# Patient Record
Sex: MALE | Race: WHITE | Employment: FULL TIME | URBAN - NONMETROPOLITAN AREA
[De-identification: names, ages, dates, MRNs, and addresses within clinical notes are randomized per-mention and may not be internally consistent; named-entity substitution may affect disease eponyms.]

---

## 2017-07-17 LAB
CHOLESTEROL, TOTAL: 177 MG/DL
CHOLESTEROL/HDL RATIO: 2.7
HDLC SERPL-MCNC: 65 MG/DL (ref 35–70)
LDL CHOLESTEROL CALCULATED: 94.2 MG/DL (ref 0–160)
TRIGL SERPL-MCNC: 89 MG/DL
VLDLC SERPL CALC-MCNC: 18 MG/DL

## 2017-08-01 ENCOUNTER — OFFICE VISIT (OUTPATIENT)
Dept: FAMILY MEDICINE CLINIC | Age: 62
End: 2017-08-01
Payer: COMMERCIAL

## 2017-08-01 VITALS — WEIGHT: 229 LBS | HEART RATE: 76 BPM | SYSTOLIC BLOOD PRESSURE: 116 MMHG | DIASTOLIC BLOOD PRESSURE: 62 MMHG

## 2017-08-01 DIAGNOSIS — K22.2 SCHATZKI'S RING OF DISTAL ESOPHAGUS: ICD-10-CM

## 2017-08-01 DIAGNOSIS — M25.552 PAIN OF LEFT HIP JOINT: ICD-10-CM

## 2017-08-01 DIAGNOSIS — I10 ESSENTIAL HYPERTENSION: Primary | ICD-10-CM

## 2017-08-01 DIAGNOSIS — Z11.59 NEED FOR HEPATITIS C SCREENING TEST: ICD-10-CM

## 2017-08-01 DIAGNOSIS — K57.30 DIVERTICULOSIS OF LARGE INTESTINE WITHOUT HEMORRHAGE: ICD-10-CM

## 2017-08-01 DIAGNOSIS — K64.8 INTERNAL HEMORRHOID: ICD-10-CM

## 2017-08-01 DIAGNOSIS — L98.9 SKIN LESION: ICD-10-CM

## 2017-08-01 PROCEDURE — 99214 OFFICE O/P EST MOD 30 MIN: CPT | Performed by: FAMILY MEDICINE

## 2017-08-01 RX ORDER — GABAPENTIN 300 MG/1
300 CAPSULE ORAL 2 TIMES DAILY
Qty: 90 CAPSULE | Refills: 5 | Status: SHIPPED | OUTPATIENT
Start: 2017-08-01 | End: 2019-01-08 | Stop reason: SINTOL

## 2017-08-01 RX ORDER — ASPIRIN 81 MG/1
81 TABLET ORAL DAILY
COMMUNITY

## 2017-08-01 RX ORDER — OMEPRAZOLE 20 MG/1
20 CAPSULE, DELAYED RELEASE ORAL DAILY
COMMUNITY
End: 2017-12-05 | Stop reason: SDUPTHER

## 2017-08-01 RX ORDER — NEBIVOLOL HYDROCHLORIDE 2.5 MG/1
TABLET ORAL
COMMUNITY
Start: 2017-05-30 | End: 2017-08-24 | Stop reason: SDUPTHER

## 2017-08-07 LAB
AGE FOR GFR: 62
ANION GAP SERPL CALCULATED.3IONS-SCNC: 10 MMOL/L
CHLORIDE BLD-SCNC: 103 MMOL/L
CO2: 30 MMOL/L
CREAT SERPL-MCNC: 1 MG/DL
EGFR BF: 68 ML/MIN/1.73 M2
EGFR BM: 92 ML/MIN/1.73 M2
EGFR WF: 56 ML/MIN/1.73 M2
EGFR WM: 76 ML/MIN/1.73 M2
HEPATITIS C IGG: NORMAL
POTASSIUM SERPL-SCNC: 4.4 MMOL/L
SIGNAL/CUTOFF: NORMAL
SODIUM BLD-SCNC: 139 MMOL/L

## 2017-08-09 DIAGNOSIS — Z11.59 NEED FOR HEPATITIS C SCREENING TEST: ICD-10-CM

## 2017-08-09 LAB — ANTIBODY: NORMAL

## 2017-11-07 ENCOUNTER — OFFICE VISIT (OUTPATIENT)
Dept: FAMILY MEDICINE CLINIC | Age: 62
End: 2017-11-07
Payer: COMMERCIAL

## 2017-11-07 VITALS — SYSTOLIC BLOOD PRESSURE: 128 MMHG | HEART RATE: 72 BPM | DIASTOLIC BLOOD PRESSURE: 68 MMHG | WEIGHT: 239 LBS

## 2017-11-07 DIAGNOSIS — H92.02 OTALGIA OF LEFT EAR: Primary | ICD-10-CM

## 2017-11-07 PROCEDURE — 99212 OFFICE O/P EST SF 10 MIN: CPT | Performed by: FAMILY MEDICINE

## 2017-11-07 RX ORDER — LORATADINE 10 MG/1
10 TABLET ORAL DAILY
Qty: 30 TABLET | Refills: 1 | Status: SHIPPED | OUTPATIENT
Start: 2017-11-07 | End: 2020-01-14

## 2017-11-07 ASSESSMENT — ENCOUNTER SYMPTOMS
SINUS PAIN: 0
SINUS PRESSURE: 0
COUGH: 1
SORE THROAT: 0

## 2017-11-07 NOTE — PROGRESS NOTES
[facility]  [address] [facility]  Dept: [phone]  Dept Fax: [phone]      Felicitas Mccarthy is a 58 y.o. male who presents today for his medical conditions/complaints as noted below. Chief Complaint   Patient presents with    Ear Fullness       HPI:     HPI   Had URI sx last week as that improved noted ear fullness. Phlegm noted though no sinus pressure. Cough now resolved. No fevers noted, feeling fluid movement in ear with tipping head. Current Outpatient Prescriptions   Medication Sig Dispense Refill    loratadine (CLARITIN) 10 MG tablet Take 1 tablet by mouth daily 5-7 days as needed 30 tablet 1    BYSTOLIC 2.5 MG tablet TAKE ONE-HALF (1/2) TO ONE TABLET ONCE DAILY 90 tablet 1    aspirin 81 MG EC tablet Take 81 mg by mouth daily      vitamin D (CHOLECALCIFEROL) 1000 UNIT TABS tablet Take 1,000 Units by mouth daily      Ubiquinol (QUNOL COQ10/UBIQUINOL/MANSOOR) 100 MG CAPS Take by mouth      omeprazole (PRILOSEC) 20 MG delayed release capsule Take 20 mg by mouth daily      gabapentin (NEURONTIN) 300 MG capsule Take 1 capsule by mouth 2 times daily Begin with 1 daily for 2 weeks 90 capsule 5     No current facility-administered medications for this visit. Allergies   Allergen Reactions    Augmentin [Amoxicillin-Pot Clavulanate] Rash       No past medical history on file. No past surgical history on file. Social History     Social History    Marital status:      Spouse name: N/A    Number of children: N/A    Years of education: N/A     Occupational History    Not on file. Social History Main Topics    Smoking status: Never Smoker    Smokeless tobacco: Never Used    Alcohol use Not on file    Drug use: Unknown    Sexual activity: Not on file     Other Topics Concern    Not on file     Social History Narrative    No narrative on file     No family history on file.     Subjective:      Review of Systems

## 2017-12-04 VITALS
BODY MASS INDEX: 29.55 KG/M2 | WEIGHT: 223 LBS | HEIGHT: 73 IN | HEART RATE: 60 BPM | SYSTOLIC BLOOD PRESSURE: 110 MMHG | DIASTOLIC BLOOD PRESSURE: 84 MMHG

## 2017-12-04 DIAGNOSIS — M54.32 SCIATICA OF LEFT SIDE: ICD-10-CM

## 2017-12-04 DIAGNOSIS — K21.9 GASTROESOPHAGEAL REFLUX DISEASE WITHOUT ESOPHAGITIS: ICD-10-CM

## 2017-12-04 DIAGNOSIS — I73.9 PERIPHERAL VASCULAR DISEASE (HCC): ICD-10-CM

## 2017-12-04 DIAGNOSIS — K64.8 INTERNAL HEMORRHOIDS: ICD-10-CM

## 2017-12-04 PROBLEM — E78.5 HYPERLIPIDEMIA: Status: ACTIVE | Noted: 2017-12-04

## 2017-12-04 RX ORDER — MELOXICAM 15 MG/1
15 TABLET ORAL DAILY
COMMUNITY
End: 2020-01-14 | Stop reason: ALTCHOICE

## 2017-12-04 RX ORDER — MULTIVITAMIN WITH IRON
400 TABLET ORAL DAILY
COMMUNITY

## 2017-12-05 ENCOUNTER — OFFICE VISIT (OUTPATIENT)
Dept: FAMILY MEDICINE CLINIC | Age: 62
End: 2017-12-05
Payer: COMMERCIAL

## 2017-12-05 VITALS
HEART RATE: 64 BPM | WEIGHT: 235 LBS | DIASTOLIC BLOOD PRESSURE: 64 MMHG | HEIGHT: 73 IN | BODY MASS INDEX: 31.14 KG/M2 | SYSTOLIC BLOOD PRESSURE: 118 MMHG

## 2017-12-05 DIAGNOSIS — I10 ESSENTIAL HYPERTENSION: ICD-10-CM

## 2017-12-05 DIAGNOSIS — Z00.00 WELL ADULT EXAM: Primary | ICD-10-CM

## 2017-12-05 DIAGNOSIS — Z23 NEED FOR PROPHYLACTIC VACCINATION AND INOCULATION AGAINST INFLUENZA: ICD-10-CM

## 2017-12-05 DIAGNOSIS — Z13.1 ENCOUNTER FOR SCREENING FOR DIABETES MELLITUS: ICD-10-CM

## 2017-12-05 PROCEDURE — 99396 PREV VISIT EST AGE 40-64: CPT | Performed by: FAMILY MEDICINE

## 2017-12-05 PROCEDURE — 90471 IMMUNIZATION ADMIN: CPT | Performed by: FAMILY MEDICINE

## 2017-12-05 PROCEDURE — 90688 IIV4 VACCINE SPLT 0.5 ML IM: CPT | Performed by: FAMILY MEDICINE

## 2017-12-05 RX ORDER — NEBIVOLOL 2.5 MG/1
TABLET ORAL
Qty: 90 TABLET | Refills: 1 | Status: SHIPPED | OUTPATIENT
Start: 2017-12-05 | End: 2018-10-20 | Stop reason: SDUPTHER

## 2017-12-05 RX ORDER — OMEPRAZOLE 20 MG/1
20 CAPSULE, DELAYED RELEASE ORAL DAILY
Qty: 90 CAPSULE | Refills: 1 | Status: SHIPPED | OUTPATIENT
Start: 2017-12-05 | End: 2018-05-07 | Stop reason: SDUPTHER

## 2017-12-05 ASSESSMENT — ENCOUNTER SYMPTOMS: SHORTNESS OF BREATH: 0

## 2017-12-05 NOTE — PROGRESS NOTES
[facility]  [address] [facility]  Dept: [phone]  Dept Fax: [phone]    Kalani Case is a 58 y.o. male who presents today for his medical conditions/complaints as noted below. Kalani Case c/o of Annual Exam      HPI:     HPI   Here for well check, has no forms for work pt is aware     No big complaints    BP Readings from Last 3 Encounters:   12/05/17 118/64   12/06/16 110/84   11/07/17 128/68          (goal 120/80)    No past medical history on file.    Past Surgical History:   Procedure Laterality Date    COLONOSCOPY      3/07; 1/9/17    SHOULDER SURGERY      UPPER GASTROINTESTINAL ENDOSCOPY  02/10/2017    stricture and dilation       Family History   Problem Relation Age of Onset    Breast Cancer Mother     Other Mother      irregular heart rate    Heart Disease Father 58    Atrial Fibrillation Father     Diabetes Father     Coronary Art Dis Father     Diabetes Maternal Grandmother     Diabetes Paternal Grandfather        Social History   Substance Use Topics    Smoking status: Never Smoker    Smokeless tobacco: Never Used    Alcohol use Not on file      Current Outpatient Prescriptions   Medication Sig Dispense Refill    nebivolol (BYSTOLIC) 2.5 MG tablet TAKE ONE-HALF (1/2) TO ONE TABLET ONCE DAILY 90 tablet 1    omeprazole (PRILOSEC) 20 MG delayed release capsule Take 1 capsule by mouth daily 90 capsule 1    aspirin 81 MG EC tablet Take 81 mg by mouth daily      vitamin D (CHOLECALCIFEROL) 1000 UNIT TABS tablet Take 1,000 Units by mouth daily      Ubiquinol (QUNOL COQ10/UBIQUINOL/MANSOOR) 100 MG CAPS Take by mouth      gabapentin (NEURONTIN) 300 MG capsule Take 1 capsule by mouth 2 times daily Begin with 1 daily for 2 weeks 90 capsule 5    meloxicam (MOBIC) 15 MG tablet Take 15 mg by mouth daily      magnesium (MAGNESIUM-OXIDE) 250 MG TABS tablet Take 250 mg by mouth daily      loratadine (CLARITIN) 10 MG tablet Take 1

## 2018-02-06 ENCOUNTER — TELEPHONE (OUTPATIENT)
Dept: FAMILY MEDICINE CLINIC | Age: 63
End: 2018-02-06

## 2018-02-21 ENCOUNTER — TELEPHONE (OUTPATIENT)
Dept: FAMILY MEDICINE CLINIC | Age: 63
End: 2018-02-21

## 2018-02-21 NOTE — TELEPHONE ENCOUNTER
Leah Denson called again about his medical forms and records requested by [facility]. Only the request form was received by [facility], none of the requested records were sent to them. He is trying to obtain short term disability and these records had a deadline to be received. Since it missed the deadline, he has a new fax number for the records to be faxed to, this fax number is [phone]. Do you remember when his records were sent to [facility]?

## 2018-02-22 ENCOUNTER — TELEPHONE (OUTPATIENT)
Dept: FAMILY MEDICINE CLINIC | Age: 63
End: 2018-02-22

## 2018-05-07 RX ORDER — OMEPRAZOLE 20 MG/1
20 CAPSULE, DELAYED RELEASE ORAL DAILY
Qty: 90 CAPSULE | Refills: 1 | Status: SHIPPED | OUTPATIENT
Start: 2018-05-07 | End: 2018-10-20 | Stop reason: SDUPTHER

## 2018-12-11 ENCOUNTER — TELEPHONE (OUTPATIENT)
Dept: FAMILY MEDICINE CLINIC | Age: 63
End: 2018-12-11

## 2018-12-11 DIAGNOSIS — I10 ESSENTIAL HYPERTENSION: ICD-10-CM

## 2018-12-11 LAB
AGE FOR GFR: 63
ANION GAP SERPL CALCULATED.3IONS-SCNC: 10 MMOL/L
CHLORIDE BLD-SCNC: 97 MMOL/L
CO2: 30 MMOL/L
CREAT SERPL-MCNC: 1 MG/DL
EGFR BF: 68 ML/MIN/1.73 M2
EGFR BM: 91 ML/MIN/1.73 M2
EGFR WF: 56 ML/MIN/1.73 M2
EGFR WM: 75 ML/MIN/1.73 M2
GLUCOSE: 86 MG/DL
POTASSIUM SERPL-SCNC: 4.4 MMOL/L
SODIUM BLD-SCNC: 133 MMOL/L

## 2019-01-08 ENCOUNTER — OFFICE VISIT (OUTPATIENT)
Dept: FAMILY MEDICINE CLINIC | Age: 64
End: 2019-01-08
Payer: COMMERCIAL

## 2019-01-08 VITALS
SYSTOLIC BLOOD PRESSURE: 118 MMHG | HEIGHT: 73 IN | WEIGHT: 234 LBS | DIASTOLIC BLOOD PRESSURE: 62 MMHG | HEART RATE: 76 BPM | BODY MASS INDEX: 31.01 KG/M2

## 2019-01-08 DIAGNOSIS — E78.2 MIXED HYPERLIPIDEMIA: ICD-10-CM

## 2019-01-08 DIAGNOSIS — I73.9 PERIPHERAL VASCULAR DISEASE (HCC): ICD-10-CM

## 2019-01-08 DIAGNOSIS — J06.9 VIRAL URI: ICD-10-CM

## 2019-01-08 DIAGNOSIS — Z00.00 WELL ADULT EXAM: Primary | ICD-10-CM

## 2019-01-08 DIAGNOSIS — Z23 NEED FOR PROPHYLACTIC VACCINATION AND INOCULATION AGAINST INFLUENZA: ICD-10-CM

## 2019-01-08 DIAGNOSIS — I10 ESSENTIAL HYPERTENSION: ICD-10-CM

## 2019-01-08 DIAGNOSIS — M25.552 PAIN OF LEFT HIP JOINT: Chronic | ICD-10-CM

## 2019-01-08 PROCEDURE — 90471 IMMUNIZATION ADMIN: CPT | Performed by: FAMILY MEDICINE

## 2019-01-08 PROCEDURE — 99396 PREV VISIT EST AGE 40-64: CPT | Performed by: FAMILY MEDICINE

## 2019-01-08 PROCEDURE — 90674 CCIIV4 VAC NO PRSV 0.5 ML IM: CPT | Performed by: FAMILY MEDICINE

## 2019-01-08 RX ORDER — NEBIVOLOL 2.5 MG/1
TABLET ORAL
Qty: 90 TABLET | Refills: 0 | Status: SHIPPED | OUTPATIENT
Start: 2019-01-08 | End: 2019-07-30 | Stop reason: SDUPTHER

## 2019-01-08 RX ORDER — OMEPRAZOLE 20 MG/1
CAPSULE, DELAYED RELEASE ORAL
Qty: 90 CAPSULE | Refills: 1 | Status: SHIPPED | OUTPATIENT
Start: 2019-01-08 | End: 2019-07-30 | Stop reason: SDUPTHER

## 2019-01-08 RX ORDER — CHLORAL HYDRATE 500 MG
3000 CAPSULE ORAL 3 TIMES DAILY
COMMUNITY

## 2019-01-08 ASSESSMENT — PATIENT HEALTH QUESTIONNAIRE - PHQ9
SUM OF ALL RESPONSES TO PHQ QUESTIONS 1-9: 0
SUM OF ALL RESPONSES TO PHQ QUESTIONS 1-9: 0
1. LITTLE INTEREST OR PLEASURE IN DOING THINGS: 0
2. FEELING DOWN, DEPRESSED OR HOPELESS: 0
SUM OF ALL RESPONSES TO PHQ9 QUESTIONS 1 & 2: 0

## 2019-12-31 LAB
ANION GAP SERPL CALCULATED.3IONS-SCNC: 7.9 MMOL/L
CHLORIDE BLD-SCNC: 102 MMOL/L
CHOLESTEROL, TOTAL: 197 MG/DL
CHOLESTEROL/HDL RATIO: 3.72
CO2: 29 MMOL/L
CREAT SERPL-MCNC: 1 MG/DL
HDLC SERPL-MCNC: 53 MG/DL (ref 35–70)
LDL CHOLESTEROL CALCULATED: 125.2 MG/DL (ref 0–160)
POTASSIUM SERPL-SCNC: 4.2 MMOL/L
SODIUM BLD-SCNC: 140 MMOL/L
TRIGL SERPL-MCNC: 94 MG/DL
VLDLC SERPL CALC-MCNC: 19 MG/DL

## 2020-01-14 ENCOUNTER — OFFICE VISIT (OUTPATIENT)
Dept: FAMILY MEDICINE CLINIC | Age: 65
End: 2020-01-14
Payer: COMMERCIAL

## 2020-01-14 VITALS
BODY MASS INDEX: 31.28 KG/M2 | DIASTOLIC BLOOD PRESSURE: 80 MMHG | OXYGEN SATURATION: 98 % | WEIGHT: 236 LBS | HEIGHT: 73 IN | SYSTOLIC BLOOD PRESSURE: 116 MMHG | HEART RATE: 69 BPM

## 2020-01-14 PROBLEM — Z00.00 WELL ADULT EXAM: Status: ACTIVE | Noted: 2020-01-14

## 2020-01-14 PROCEDURE — 90471 IMMUNIZATION ADMIN: CPT | Performed by: FAMILY MEDICINE

## 2020-01-14 PROCEDURE — 90472 IMMUNIZATION ADMIN EACH ADD: CPT | Performed by: FAMILY MEDICINE

## 2020-01-14 PROCEDURE — 99396 PREV VISIT EST AGE 40-64: CPT | Performed by: FAMILY MEDICINE

## 2020-01-14 PROCEDURE — 90686 IIV4 VACC NO PRSV 0.5 ML IM: CPT | Performed by: FAMILY MEDICINE

## 2020-01-14 PROCEDURE — 90715 TDAP VACCINE 7 YRS/> IM: CPT | Performed by: FAMILY MEDICINE

## 2020-01-14 ASSESSMENT — ENCOUNTER SYMPTOMS
SHORTNESS OF BREATH: 0
SINUS PAIN: 1
COUGH: 1
ABDOMINAL PAIN: 0
NAUSEA: 0
VOMITING: 0

## 2020-01-14 ASSESSMENT — PATIENT HEALTH QUESTIONNAIRE - PHQ9
SUM OF ALL RESPONSES TO PHQ QUESTIONS 1-9: 0
SUM OF ALL RESPONSES TO PHQ9 QUESTIONS 1 & 2: 0
1. LITTLE INTEREST OR PLEASURE IN DOING THINGS: 0
SUM OF ALL RESPONSES TO PHQ QUESTIONS 1-9: 0
2. FEELING DOWN, DEPRESSED OR HOPELESS: 0

## 2020-01-14 NOTE — PROGRESS NOTES
[address] [address]  Dept: [phone]  Dept Fax: [phone]    Joanna Acosta is a 59 y.o. male who presents today for his medical conditions/complaints as noted below. Joanna Acosta c/o of Annual Exam      HPI:     HPI  Pt here for annual wellness evaluation  HTN doing well per pt, still using omeprazole daily- worse if eating late  Not needing meloxicam use as needed for hips- resolved    Reports increasing issues with congestion cough and sinus pressure for the last several days. Patient also complaining of occasional palpitations, no specific triggers noted. BP Readings from Last 3 Encounters:   01/14/20 116/80   01/08/19 118/62   12/05/17 118/64          (goal 120/80)    No past medical history on file. Past Surgical History:   Procedure Laterality Date    COLONOSCOPY      3/07; 1/9/17    SHOULDER SURGERY      UPPER GASTROINTESTINAL ENDOSCOPY  02/10/2017    stricture and dilation       Family History   Problem Relation Age of Onset    Breast Cancer Mother     Other Mother         irregular heart rate    Heart Disease Father 58    Atrial Fibrillation Father     Diabetes Father     Coronary Art Dis Father     Diabetes Maternal Grandmother     Diabetes Paternal Grandfather        Social History     Tobacco Use    Smoking status: Never Smoker    Smokeless tobacco: Never Used   Substance Use Topics    Alcohol use: Not on file      Prior to Visit Medications    Medication Sig Taking?  Authorizing Provider   omeprazole (PRILOSEC) 20 MG delayed release capsule TAKE 1 CAPSULE DAILY  Marco Jones MD   nebivolol (BYSTOLIC) 2.5 MG tablet TAKE ONE-HALF (1/2) TO ONE TABLET DAILY  Marco Jones MD   Omega-3 Fatty Acids (FISH OIL) 1000 MG CAPS Take 3,000 mg by mouth 3 times daily  Historical Provider, MD   meloxicam (MOBIC) 15 MG tablet Take 15 mg by mouth daily  Historical Provider, MD   magnesium (MAGNESIUM-OXIDE) 250 MG TABS tablet Take 250 mg by mouth daily  Historical Provider, MD   loratadine (CLARITIN) 10 MG tablet Take 1 tablet by mouth daily 5-7 days as needed  Jasmin Hennessy MD   aspirin 81 MG EC tablet Take 81 mg by mouth daily  Historical Provider, MD   vitamin D (CHOLECALCIFEROL) 1000 UNIT TABS tablet Take 1,000 Units by mouth daily  Historical Provider, MD   Ubiquinol (QUNOL COQ10/UBIQUINOL/MANSOOR) 100 MG CAPS Take by mouth  Historical Provider, MD     Allergies   Allergen Reactions    Gabapentin Other (See Comments)     Dizziness reported    Augmentin [Amoxicillin-Pot Clavulanate] Rash       Health Maintenance   Topic Date Due    HIV screen  03[birth year]15/1970    Shingles Vaccine (1 of 2) 03/15/2005    Potassium monitoring  12/31/2020    Creatinine monitoring  12/31/2020    Lipid screen  12/31/2024    Colon cancer screen colonoscopy  01/09/2027    DTaP/Tdap/Td vaccine (3 - Td) 01/14/2030    Flu vaccine  Completed    Hepatitis C screen  Completed    Pneumococcal 0-64 years Vaccine  Aged Out       Subjective:      Review of Systems   Constitutional: Negative for activity change, appetite change and fatigue. HENT: Positive for congestion and sinus pain. Eyes: Negative for visual disturbance. Respiratory: Positive for cough (started last friday has not gotten better). Negative for shortness of breath. Cardiovascular: Positive for palpitations (once in a while). Negative for chest pain and leg swelling. Gastrointestinal: Negative for abdominal pain, nausea and vomiting. Genitourinary: Negative for dysuria and frequency. Musculoskeletal: Negative for arthralgias and myalgias. Neurological: Negative for dizziness. Psychiatric/Behavioral: Negative for confusion and sleep disturbance. The patient is not nervous/anxious.         Objective:     /80 (Site: Left Upper Arm, Position: Sitting, Cuff Size: Large Adult)   Pulse 69   Ht 6' 0.52\" (1.842 m)   Wt 236 lb (107 kg)   SpO2 98%   BMI 31.55 kg/m²

## 2020-02-13 PROBLEM — Z00.00 WELL ADULT EXAM: Status: RESOLVED | Noted: 2020-01-14 | Resolved: 2020-02-13

## 2020-10-06 LAB
ANION GAP SERPL CALCULATED.3IONS-SCNC: 5.7 MMOL/L
CHLORIDE BLD-SCNC: 101 MMOL/L (ref 98–120)
CHOLESTEROL/HDL RATIO: 3.27 RATIO (ref 0–4.5)
CHOLESTEROL: 183 MG/DL (ref 50–200)
CO2: 30 MMOL/L (ref 22–31)
CREAT SERPL-MCNC: 0.9 MG/DL (ref 0.7–1.3)
GFR CALCULATED: > 60
HDLC SERPL-MCNC: 56 MG/DL (ref 36–68)
LDL CHOLESTEROL CALCULATED: 114 MG/DL (ref 0–160)
POTASSIUM SERPL-SCNC: 4.4 MMOL/L (ref 3.6–5)
SODIUM BLD-SCNC: 137 MMOL/L (ref 135–145)
TRIGL SERPL-MCNC: 65 MG/DL (ref 10–250)
VLDLC SERPL CALC-MCNC: 13 MG/DL (ref 0–50)

## 2020-10-27 ENCOUNTER — VIRTUAL VISIT (OUTPATIENT)
Dept: FAMILY MEDICINE CLINIC | Age: 65
End: 2020-10-27
Payer: COMMERCIAL

## 2020-10-27 PROCEDURE — 99442 PR PHYS/QHP TELEPHONE EVALUATION 11-20 MIN: CPT | Performed by: FAMILY MEDICINE

## 2020-10-27 RX ORDER — IPRATROPIUM BROMIDE 42 UG/1
2 SPRAY, METERED NASAL 4 TIMES DAILY
Qty: 1 BOTTLE | Refills: 0 | Status: SHIPPED | OUTPATIENT
Start: 2020-10-27 | End: 2021-08-24 | Stop reason: ALTCHOICE

## 2020-10-27 ASSESSMENT — ENCOUNTER SYMPTOMS
ABDOMINAL PAIN: 0
SINUS PAIN: 1
SORE THROAT: 1
WHEEZING: 0
COUGH: 1
NAUSEA: 0
SHORTNESS OF BREATH: 0

## 2020-10-27 NOTE — LETTER
Mylanta 30 ml up to 4 times daily for sore throat  Motrin 600 mg 3 times daily until better plus 1 day  May use atrovent nasal 0.06 % 2 sprays up to 4 times daily as needed for congestion/ear pain

## 2020-10-27 NOTE — PROGRESS NOTES
[id number] [address], [id number]  Dept: [phone]  Dept Fax: [phone]      Arnaldo Lara is a 72 y.o. male who presents today for his medical conditions/complaints as noted below. Chief Complaint   Patient presents with    Pharyngitis    Otalgia       HPI:     HPI  Video visit with pt imtiaz. me with pt at home and me at [facility] office. Pt here without audio working on pt side. 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 Patient does also have some sinus pain which is ongoing and does feel some drainage which has been causing cough also. No fevers noted 98.1  No ill contacts noted, attempted tylenol- slight help. Salt water did not resolve either  No irritation noted    Current Outpatient Medications   Medication Sig Dispense Refill    nebivolol (BYSTOLIC) 2.5 MG tablet TAKE ONE-HALF (1/2) TO ONE TABLET DAILY 90 tablet 3    omeprazole (PRILOSEC) 20 MG delayed release capsule TAKE 1 CAPSULE DAILY 90 capsule 3    Omega-3 Fatty Acids (FISH OIL) 1000 MG CAPS Take 3,000 mg by mouth 3 times daily      magnesium (MAGNESIUM-OXIDE) 250 MG TABS tablet Take 250 mg by mouth daily      aspirin 81 MG EC tablet Take 81 mg by mouth daily      vitamin D (CHOLECALCIFEROL) 1000 UNIT TABS tablet Take 1,000 Units by mouth daily      Ubiquinol (QUNOL COQ10/UBIQUINOL/MANSOOR) 100 MG CAPS Take by mouth       No current facility-administered medications for this visit. Allergies   Allergen Reactions    Gabapentin Other (See Comments)     Dizziness reported    Augmentin [Amoxicillin-Pot Clavulanate] Rash       Subjective:      Review of Systems   Constitutional: Negative for fatigue and fever. Is wondering if sx could be related to drilling concrete on Saturday without a mask on.  Sore throat started after [employer] on Sunday and has continued. Sore throat has continued but his ear is also really bothering him now. HENT: Positive for ear pain, sinus pain ( has had ongoing sinus issues, feels that the drainage is causing his coughing. ) and sore throat. Negative for congestion. Respiratory: Positive for cough. Negative for shortness of breath and wheezing. Cardiovascular: Negative for chest pain. Gastrointestinal: Negative for abdominal pain and nausea. Neurological: Negative for dizziness and headaches. Objective: There were no vitals taken for this visit. Physical Exam  Constitutional:       General: He is not in acute distress. Appearance: He is not ill-appearing or toxic-appearing. Eyes:      Conjunctiva/sclera: Conjunctivae normal.   Pulmonary:      Effort: Pulmonary effort is normal. No respiratory distress. Neurological:      Mental Status: He is alert. Psychiatric:         Thought Content: Thought content normal.         Judgment: Judgment normal.       Video accomplished prior to transition to phone visit. Assessment:   The primary encounter diagnosis was Acute pharyngitis, unspecified etiology. A diagnosis of Otalgia of right ear was also pertinent to this visit. Plan:     Patient Instructions   Mylanta 30 ml up to 4 times daily for sore throat  Motrin 600 mg 3 times daily until better plus 1 day  May use atrovent nasal 0.06 % 2 sprays up to 4 times daily as needed for congestion/ear pain      Return if symptoms worsen or fail to improve. No orders of the defined types were placed in this encounter. No orders of the defined types were placed in this encounter.          Electronically signed by Maya Begum MD on 10/27/2020 at 2:31 PM

## 2020-10-29 ENCOUNTER — TELEPHONE (OUTPATIENT)
Dept: FAMILY MEDICINE CLINIC | Age: 65
End: 2020-10-29

## 2020-10-29 NOTE — TELEPHONE ENCOUNTER
Pt called stating having a low grade fever of 99.4 that started on Tuesday, pt is taking ibuprofen, also has a canker sore on right side, wants to know if there is anything he should do?

## 2020-10-29 NOTE — TELEPHONE ENCOUNTER
Likely all symptoms related to viral infection. No specific treatment needed. Encourage good fluid intake except coffee, tea, alcohol and should resolve in 7-10 days.    Thanks

## 2020-10-30 ENCOUNTER — HOSPITAL ENCOUNTER (OUTPATIENT)
Age: 65
Setting detail: SPECIMEN
Discharge: HOME | End: 2020-10-30
Payer: COMMERCIAL

## 2020-10-30 ENCOUNTER — OFFICE VISIT (OUTPATIENT)
Dept: FAMILY MEDICINE CLINIC | Age: 65
End: 2020-10-30
Payer: COMMERCIAL

## 2020-10-30 VITALS
TEMPERATURE: 98.7 F | SYSTOLIC BLOOD PRESSURE: 136 MMHG | DIASTOLIC BLOOD PRESSURE: 80 MMHG | OXYGEN SATURATION: 98 % | HEART RATE: 80 BPM | RESPIRATION RATE: 16 BRPM | WEIGHT: 228 LBS | BODY MASS INDEX: 30.48 KG/M2

## 2020-10-30 LAB — S PYO AG THROAT QL: NORMAL

## 2020-10-30 PROCEDURE — 96372 THER/PROPH/DIAG INJ SC/IM: CPT | Performed by: NURSE PRACTITIONER

## 2020-10-30 PROCEDURE — 99214 OFFICE O/P EST MOD 30 MIN: CPT | Performed by: NURSE PRACTITIONER

## 2020-10-30 PROCEDURE — 87880 STREP A ASSAY W/OPTIC: CPT | Performed by: NURSE PRACTITIONER

## 2020-10-30 PROCEDURE — 87081 CULTURE SCREEN ONLY: CPT

## 2020-10-30 RX ORDER — CLINDAMYCIN HYDROCHLORIDE 150 MG/1
450 CAPSULE ORAL 3 TIMES DAILY
Qty: 90 CAPSULE | Refills: 0 | Status: SHIPPED | OUTPATIENT
Start: 2020-10-30 | End: 2020-11-09

## 2020-10-30 RX ORDER — DEXAMETHASONE SODIUM PHOSPHATE 4 MG/ML
10 INJECTION, SOLUTION INTRA-ARTICULAR; INTRALESIONAL; INTRAMUSCULAR; INTRAVENOUS; SOFT TISSUE ONCE
Status: COMPLETED | OUTPATIENT
Start: 2020-10-30 | End: 2020-10-30

## 2020-10-30 RX ADMIN — DEXAMETHASONE SODIUM PHOSPHATE 10 MG: 4 INJECTION, SOLUTION INTRA-ARTICULAR; INTRALESIONAL; INTRAMUSCULAR; INTRAVENOUS; SOFT TISSUE at 11:30

## 2020-10-30 ASSESSMENT — ENCOUNTER SYMPTOMS
VOMITING: 0
COUGH: 0
NAUSEA: 0
SINUS PAIN: 0
SORE THROAT: 1
FACIAL SWELLING: 1
CHEST TIGHTNESS: 0
SINUS PRESSURE: 0
SWOLLEN GLANDS: 1
SHORTNESS OF BREATH: 0
TROUBLE SWALLOWING: 0
VOICE CHANGE: 0

## 2020-10-30 ASSESSMENT — PATIENT HEALTH QUESTIONNAIRE - PHQ9
SUM OF ALL RESPONSES TO PHQ QUESTIONS 1-9: 0
SUM OF ALL RESPONSES TO PHQ QUESTIONS 1-9: 0
2. FEELING DOWN, DEPRESSED OR HOPELESS: 0
1. LITTLE INTEREST OR PLEASURE IN DOING THINGS: 0
SUM OF ALL RESPONSES TO PHQ QUESTIONS 1-9: 0
SUM OF ALL RESPONSES TO PHQ9 QUESTIONS 1 & 2: 0

## 2020-10-30 NOTE — PROGRESS NOTES
[address], APRN-CNP  [address]  Phone:  [phone]  Fax:  [phone]  Juani Guzman is a 72 y.o. male who presents today for his medical conditions/complaints as noted below. Juani Guzman c/o of Jaw Pain (sore throat past 3 days, low grade fever, swelling face)    Sore throat came first, now side of face is getting swollen and painful. HPI:     Pharyngitis   This is a new problem. The current episode started in the past 7 days (started 4 days ago). The problem occurs constantly. The problem has been gradually worsening. Associated symptoms include chills, a fever (tmax 100.1, broke last night), neck pain, a sore throat and swollen glands. Pertinent negatives include no congestion, coughing, diaphoresis, headaches, nausea or vomiting. Associated symptoms comments: Feels like he can't exhale through his nose  . The symptoms are aggravated by swallowing. He has tried acetaminophen and NSAIDs (atrovent nasal) for the symptoms. The treatment provided moderate relief. Wt Readings from Last 3 Encounters:   10/30/20 228 lb (103.4 kg)   01/14/20 236 lb (107 kg)   01/08/19 234 lb (106.1 kg)       Temp Readings from Last 3 Encounters:   10/30/20 98.7 °F (37.1 °C)       BP Readings from Last 3 Encounters:   10/30/20 136/80   01/14/20 116/80   01/08/19 118/62       Pulse Readings from Last 3 Encounters:   10/30/20 80   01/14/20 69   01/08/19 76              History reviewed. No pertinent past medical history.    Past Surgical History:   Procedure Laterality Date    COLONOSCOPY      3/07; 1/9/17    SHOULDER SURGERY      UPPER GASTROINTESTINAL ENDOSCOPY  02/10/2017    stricture and dilation     Family History   Problem Relation Age of Onset    Breast Cancer Mother     Other Mother         irregular heart rate    Heart Disease Father 58    Atrial Fibrillation Father     Diabetes Father     Coronary Art Dis Father     Diabetes Maternal Grandmother     Diabetes Paternal Grandfather      Social History     Tobacco Use    Smoking status: Never Smoker    Smokeless tobacco: Never Used   Substance Use Topics    Alcohol use: Not on file      Current Outpatient Medications   Medication Sig Dispense Refill    clindamycin (CLEOCIN) 150 MG capsule Take 3 capsules by mouth 3 times daily for 10 days 90 capsule 0    ipratropium (ATROVENT) 0.06 % nasal spray 2 sprays by Each Nostril route 4 times daily 1 Bottle 0    nebivolol (BYSTOLIC) 2.5 MG tablet TAKE ONE-HALF (1/2) TO ONE TABLET DAILY 90 tablet 3    omeprazole (PRILOSEC) 20 MG delayed release capsule TAKE 1 CAPSULE DAILY 90 capsule 3    Omega-3 Fatty Acids (FISH OIL) 1000 MG CAPS Take 3,000 mg by mouth 3 times daily      magnesium (MAGNESIUM-OXIDE) 250 MG TABS tablet Take 250 mg by mouth daily      aspirin 81 MG EC tablet Take 81 mg by mouth daily      vitamin D (CHOLECALCIFEROL) 1000 UNIT TABS tablet Take 1,000 Units by mouth daily      Ubiquinol (QUNOL COQ10/UBIQUINOL/MANSOOR) 100 MG CAPS Take by mouth       Current Facility-Administered Medications   Medication Dose Route Frequency Provider Last Rate Last Dose    dexamethasone (DECADRON) injection 10 mg  10 mg Other Once JAVIER Barahona NP         Allergies   Allergen Reactions    Gabapentin Other (See Comments)     Dizziness reported    Augmentin [Amoxicillin-Pot Clavulanate] Rash       No exam data present    Subjective:      Review of Systems   Constitutional: Positive for chills and fever (tmax 100.1, broke last night). Negative for diaphoresis. HENT: Positive for ear pain, facial swelling and sore throat. Negative for congestion, drooling, sinus pressure, sinus pain, trouble swallowing and voice change. Pain with swallowing     Respiratory: Negative for cough, chest tightness and shortness of breath. Gastrointestinal: Negative for nausea and vomiting. Musculoskeletal: Positive for neck pain.    Neurological: Negative for headaches. Objective:     /80 (Site: Right Upper Arm, Position: Sitting, Cuff Size: Medium Adult)   Pulse 80   Temp 98.7 °F (37.1 °C)   Resp 16   Wt 228 lb (103.4 kg)   SpO2 98%   BMI 30.48 kg/m²     Physical Exam  Vitals signs reviewed. Constitutional:       General: He is not in acute distress. Appearance: He is well-developed. He is not ill-appearing, toxic-appearing or diaphoretic. HENT:      Head: Normocephalic. Salivary Glands: Right salivary gland is diffusely enlarged and tender. Right Ear: Tympanic membrane, ear canal and external ear normal.      Left Ear: Tympanic membrane, ear canal and external ear normal.      Nose: Nose normal. No mucosal edema, congestion or rhinorrhea. Mouth/Throat:      Lips: Pink. Mouth: Mucous membranes are moist. Mucous membranes are not pale and not dry. Pharynx: Pharyngeal swelling, posterior oropharyngeal erythema and uvula swelling present. No oropharyngeal exudate. Tonsils: 4+ on the right. 2+ on the left. Eyes:      General: Lids are normal. No scleral icterus. Right eye: No discharge. Left eye: No discharge. Extraocular Movements:      Right eye: No nystagmus. Left eye: No nystagmus. Conjunctiva/sclera: Conjunctivae normal.   Neck:      Musculoskeletal: Full passive range of motion without pain and normal range of motion. Trachea: Trachea normal.   Cardiovascular:      Rate and Rhythm: Normal rate and regular rhythm. Pulses: Normal pulses. Heart sounds: Normal heart sounds. Pulmonary:      Effort: Pulmonary effort is normal. No accessory muscle usage or respiratory distress. Breath sounds: Normal breath sounds. No stridor. No wheezing, rhonchi or rales. Chest:      Chest wall: No tenderness. Musculoskeletal: Normal range of motion. Skin:     General: Skin is warm and dry. Capillary Refill: Capillary refill takes less than 2 seconds. (Tylenol), ibuprofen (Advil, Motrin), or naproxen (Aleve). Be safe with medicines. Read and follow all instructions on the label. Do not give aspirin to anyone younger than 20. It has been linked to Reye syndrome, a serious illness. · Put an ice or heat pack (whichever feels better) on the swollen jaw for 10 to 20 minutes at a time. Put a thin cloth between the ice or heat pack and the skin. · Suck on ice chips or ice treats such as Popsicles. Eat soft foods that do not have to be chewed much. · If your doctor prescribed antibiotics, take them as directed. Do not stop taking them just because you feel better. You need to take the full course of antibiotics. To prevent tooth problems  · Brush and floss every day, and have regular dental checkups. · Eat a healthy diet, and avoid sugary foods and drinks. · Do not smoke or use spit tobacco. Tobacco use slows your ability to heal. It also increases your risk for gum disease and cancer of the mouth and throat. If you need help quitting, talk to your doctor about stop-smoking programs and medicines. These can increase your chances of quitting for good. When should you call for help? Call 911 anytime you think you may need emergency care. For example, call if:    · You have trouble breathing. Call your doctor now or seek immediate medical care if:    · You have new or worse symptoms of infection, such as:  ? Increased pain, swelling, warmth, or redness. ? Red streaks leading from the area. ? Pus draining from the area. ? A fever.     · You have new pain, or the pain gets worse. Watch closely for changes in your health, and be sure to contact your doctor if:    · You do not feel better as expected. Where can you learn more? Go to https://Neurotrope Biosciencegranteb.Premise. org and sign in to your Transphorm account. Enter L645 in the TalkBox [employer] box to learn more about \"Parotitis: Care Instructions. \"     If you do not have an account, please click on the \"Sign Up Now\" link. Current as of: April 15, 2020               Content Version: 12.6  © 2006-2020 [employer] Care instructions adapted under license by [facility] ([facility]). If you have questions about a medical condition or this instruction, always ask your healthcare professional. [address] any warranty or liability for your use of this information. Patient Education        Tonsillitis: Care Instructions  Your Care Instructions     Tonsillitis is an infection of the tonsils that is caused by bacteria or a virus. The tonsils are in the back of the throat and are part of the immune system. Tonsillitis typically lasts from a few days up to a couple of weeks. Tonsillitis caused by a virus goes away on its own. Tonsillitis caused by the bacteria that causes strep throat is treated with antibiotics. You and your doctor may consider surgery to remove the tonsils (tonsillectomy) if you have serious complications or repeat infections. Follow-up care is a key part of your treatment and safety. Be sure to make and go to all appointments, and call your doctor if you are having problems. It's also a good idea to know your test results and keep a list of the medicines you take. How can you care for yourself at home? · If your doctor prescribed antibiotics, take them as directed. Do not stop taking them just because you feel better. You need to take the full course of antibiotics. · Gargle with warm salt water. This helps reduce swelling and relieve discomfort. Gargle once an hour with 1 teaspoon of salt mixed in 8 fluid ounces of warm water. · Take an over-the-counter pain medicine, such as acetaminophen (Tylenol), ibuprofen (Advil, Motrin), or naproxen (Aleve). Be safe with medicines. Read and follow all instructions on the label. No one younger than 20 should take aspirin. It has been linked to Reye syndrome, a serious illness.   · Be careful when taking over-the-counter cold or flu medicines and Tylenol at the same time. Many of these medicines have acetaminophen, which is Tylenol. Read the labels to make sure that you are not taking more than the recommended dose. Too much acetaminophen (Tylenol) can be harmful. · Try an over-the-counter throat spray to relieve throat pain. · Drink plenty of fluids. Fluids may help soothe an irritated throat. Drink warm or cool liquids (whichever feels better). These include tea, soup, and juice. · Do not smoke, and avoid secondhand smoke. Smoking can make tonsillitis worse. If you need help quitting, talk to your doctor about stop-smoking programs and medicines. These can increase your chances of quitting for good. · Use a vaporizer or humidifier to add moisture to your bedroom. Follow the directions for cleaning the machine. When should you call for help? Call your doctor now or seek immediate medical care if:    · Your pain gets worse on one side of your throat.     · You have a new or higher fever.     · You notice changes in your voice.     · You have trouble opening your mouth.     · You have any trouble breathing.     · You have much more trouble swallowing.     · You have a fever with a stiff neck or a severe headache.     · You are sensitive to light or feel very sleepy or confused. Watch closely for changes in your health, and be sure to contact your doctor if:    · You do not get better after 2 days. Where can you learn more? Go to https://SonoseddieServeron."CollabIP, Inc.". org and sign in to your MediaInterface [place] account. Enter G130 in the [employer] box to learn more about \"Tonsillitis: Care Instructions. \"     If you do not have an account, please click on the \"Sign Up Now\" link. Current as of: April 15, 2020               Content Version: 12.6  © 9476-2427 [employer]. Care instructions adapted under license by [facility] ([facility]).  If you have questions about a medical condition or this instruction, always ask

## 2020-10-30 NOTE — PATIENT INSTRUCTIONS
Clindamycin 450 mg three times a day for 10 days  Dexamethasone 10 mg   Patient Education        Parotitis: Care Instructions  Your Care Instructions     Parotitis is a painful swelling of your parotid glands, which are salivary glands located between the ear and jaw. The most common cause is a virus, such as mumps, herpes, or Jaz-Barr. Bacterial infections, diabetes, tumors or stones in the saliva glands, and tooth problems also may cause parotitis. Follow-up care is a key part of your treatment and safety. Be sure to make and go to all appointments, and call your doctor if you are having problems. It's also a good idea to know your test results and keep a list of the medicines you take. How can you care for yourself at home? · Use an over-the-counter pain medicine if needed, such as acetaminophen (Tylenol), ibuprofen (Advil, Motrin), or naproxen (Aleve). Be safe with medicines. Read and follow all instructions on the label. Do not give aspirin to anyone younger than 20. It has been linked to Reye syndrome, a serious illness. · Put an ice or heat pack (whichever feels better) on the swollen jaw for 10 to 20 minutes at a time. Put a thin cloth between the ice or heat pack and the skin. · Suck on ice chips or ice treats such as Popsicles. Eat soft foods that do not have to be chewed much. · If your doctor prescribed antibiotics, take them as directed. Do not stop taking them just because you feel better. You need to take the full course of antibiotics. To prevent tooth problems  · Brush and floss every day, and have regular dental checkups. · Eat a healthy diet, and avoid sugary foods and drinks. · Do not smoke or use spit tobacco. Tobacco use slows your ability to heal. It also increases your risk for gum disease and cancer of the mouth and throat. If you need help quitting, talk to your doctor about stop-smoking programs and medicines. These can increase your chances of quitting for good.   When should you call for help? Call 911 anytime you think you may need emergency care. For example, call if:    · You have trouble breathing. Call your doctor now or seek immediate medical care if:    · You have new or worse symptoms of infection, such as:  ? Increased pain, swelling, warmth, or redness. ? Red streaks leading from the area. ? Pus draining from the area. ? A fever.     · You have new pain, or the pain gets worse. Watch closely for changes in your health, and be sure to contact your doctor if:    · You do not feel better as expected. Where can you learn more? Go to https://EventBrowsr.compepiceweb.Lynk. org and sign in to your Panelfly account. Enter O115 in the ClassBadges box to learn more about \"Parotitis: Care Instructions. \"     If you do not have an account, please click on the \"Sign Up Now\" link. Current as of: April 15, 2020               Content Version: 12.6  © 2006-2020 [employer]. Care instructions adapted under license by [facility] ([facility]). If you have questions about a medical condition or this instruction, always ask your healthcare professional. [place] [address] any warranty or liability for your use of this information. Patient Education        Tonsillitis: Care Instructions  Your Care Instructions     Tonsillitis is an infection of the tonsils that is caused by bacteria or a virus. The tonsils are in the back of the throat and are part of the immune system. Tonsillitis typically lasts from a few days up to a couple of weeks. Tonsillitis caused by a virus goes away on its own. Tonsillitis caused by the bacteria that causes strep throat is treated with antibiotics. You and your doctor may consider surgery to remove the tonsils (tonsillectomy) if you have serious complications or repeat infections. Follow-up care is a key part of your treatment and safety.  Be sure to make and go to all appointments, and call your doctor if you are having problems. It's also a good idea to know your test results and keep a list of the medicines you take. How can you care for yourself at home? · If your doctor prescribed antibiotics, take them as directed. Do not stop taking them just because you feel better. You need to take the full course of antibiotics. · Gargle with warm salt water. This helps reduce swelling and relieve discomfort. Gargle once an hour with 1 teaspoon of salt mixed in 8 fluid ounces of warm water. · Take an over-the-counter pain medicine, such as acetaminophen (Tylenol), ibuprofen (Advil, Motrin), or naproxen (Aleve). Be safe with medicines. Read and follow all instructions on the label. No one younger than 20 should take aspirin. It has been linked to Reye syndrome, a serious illness. · Be careful when taking over-the-counter cold or flu medicines and Tylenol at the same time. Many of these medicines have acetaminophen, which is Tylenol. Read the labels to make sure that you are not taking more than the recommended dose. Too much acetaminophen (Tylenol) can be harmful. · Try an over-the-counter throat spray to relieve throat pain. · Drink plenty of fluids. Fluids may help soothe an irritated throat. Drink warm or cool liquids (whichever feels better). These include tea, soup, and juice. · Do not smoke, and avoid secondhand smoke. Smoking can make tonsillitis worse. If you need help quitting, talk to your doctor about stop-smoking programs and medicines. These can increase your chances of quitting for good. · Use a vaporizer or humidifier to add moisture to your bedroom. Follow the directions for cleaning the machine. When should you call for help?    Call your doctor now or seek immediate medical care if:    · Your pain gets worse on one side of your throat.     · You have a new or higher fever.     · You notice changes in your voice.     · You have trouble opening your mouth.     · You have any trouble breathing.     · You have much

## 2020-10-31 ENCOUNTER — OFFICE VISIT (OUTPATIENT)
Dept: FAMILY MEDICINE CLINIC | Age: 65
End: 2020-10-31
Payer: COMMERCIAL

## 2020-10-31 VITALS
BODY MASS INDEX: 30.48 KG/M2 | SYSTOLIC BLOOD PRESSURE: 136 MMHG | WEIGHT: 228 LBS | OXYGEN SATURATION: 97 % | DIASTOLIC BLOOD PRESSURE: 82 MMHG | HEART RATE: 80 BPM

## 2020-10-31 PROCEDURE — 99213 OFFICE O/P EST LOW 20 MIN: CPT | Performed by: PHYSICIAN ASSISTANT

## 2020-10-31 ASSESSMENT — ENCOUNTER SYMPTOMS
RESPIRATORY NEGATIVE: 1
NAUSEA: 0

## 2020-10-31 NOTE — PROGRESS NOTES
[facility]    Subjective:      Patient ID: Mignon Riley is a 72 y.o. y.o. male. Patient is seen today for recheck on tonsillitis. He feels better today he states. Has questions regarding compresses and probiotics. No fever chills. He is able to swallow now. Still has some swelling right mandibular area it is slightly tender. Taking motrin too for pain and swelling. History reviewed. No pertinent past medical history.     Past Surgical History:   Procedure Laterality Date    COLONOSCOPY      3/07; 1/9/17    SHOULDER SURGERY      UPPER GASTROINTESTINAL ENDOSCOPY  02/10/2017    stricture and dilation       Family History   Problem Relation Age of Onset    Breast Cancer Mother     Other Mother         irregular heart rate    Heart Disease Father 58    Atrial Fibrillation Father     Diabetes Father     Coronary Art Dis Father     Diabetes Maternal Grandmother     Diabetes Paternal Grandfather        Allergies   Allergen Reactions    Gabapentin Other (See Comments)     Dizziness reported    Augmentin [Amoxicillin-Pot Clavulanate] Rash       Current Outpatient Medications   Medication Sig Dispense Refill    clindamycin (CLEOCIN) 150 MG capsule Take 3 capsules by mouth 3 times daily for 10 days 90 capsule 0    ipratropium (ATROVENT) 0.06 % nasal spray 2 sprays by Each Nostril route 4 times daily 1 Bottle 0    nebivolol (BYSTOLIC) 2.5 MG tablet TAKE ONE-HALF (1/2) TO ONE TABLET DAILY 90 tablet 3    omeprazole (PRILOSEC) 20 MG delayed release capsule TAKE 1 CAPSULE DAILY 90 capsule 3    Omega-3 Fatty Acids (FISH OIL) 1000 MG CAPS Take 3,000 mg by mouth 3 times daily      magnesium (MAGNESIUM-OXIDE) 250 MG TABS tablet Take 250 mg by mouth daily      aspirin 81 MG EC tablet Take 81 mg by mouth daily      vitamin D (CHOLECALCIFEROL) 1000 UNIT TABS tablet Take 1,000 Units by mouth daily      Ubiquinol (QUNOL COQ10/UBIQUINOL/MANSOOR) 100 MG CAPS Take by mouth       No current facility-administered medications for this visit. Review of Systems   Constitutional: Positive for appetite change. Negative for fatigue. HENT: Positive for postnasal drip. Respiratory: Negative. Cardiovascular: Negative. Gastrointestinal: Negative for nausea. Taking prilosec and mylanta. He did have some gerd last night in bed and had some regurgitation last night. Added a pillow and it helped. Had a bad taste in mouth this am.    Musculoskeletal: Negative. Neurological: Negative. Psychiatric/Behavioral: Negative for sleep disturbance. Objective:      /82 (Site: Right Upper Arm)   Pulse 80   Wt 228 lb (103.4 kg)   SpO2 97%   BMI 30.48 kg/m²     Physical Exam  Vitals signs and nursing note reviewed. Constitutional:       General: He is not in acute distress. Appearance: Normal appearance. He is well-developed. He is not ill-appearing. HENT:      Head: Normocephalic and atraumatic. Right Ear: Tympanic membrane, ear canal and external ear normal.      Left Ear: Tympanic membrane, ear canal and external ear normal.      Ears:      Comments: Scant cerumen noted in the entrance of ear canals. Nose: Nose normal. No congestion or rhinorrhea. Mouth/Throat:      Mouth: Mucous membranes are moist.      Pharynx: Oropharyngeal exudate and posterior oropharyngeal erythema present. Comments: Erythema noted in pharynx white exudate noted in the posterior pharynx. Slight postnasal drip noted as well. Eyes:      General: No scleral icterus. Conjunctiva/sclera: Conjunctivae normal.   Neck:      Musculoskeletal: Normal range of motion and neck supple. Muscular tenderness present. No neck rigidity. Comments: He has mild swelling in the right mandibular area. Slight pain in the mandibular area and preauricular area. Mild lymphadenopathy noted in the preauricular area the right tonsillar area and mandibular region.    Cardiovascular:      Rate and Rhythm: Normal rate and regular rhythm. Heart sounds: Normal heart sounds. No murmur. No gallop. Pulmonary:      Effort: Pulmonary effort is normal. No respiratory distress. Breath sounds: Normal breath sounds. No stridor. No wheezing, rhonchi or rales. Abdominal:      General: Bowel sounds are normal. There is no distension. Palpations: Abdomen is soft. There is no mass. Tenderness: There is no abdominal tenderness. There is no guarding or rebound. Hernia: No hernia is present. Musculoskeletal:         General: No swelling, tenderness, deformity or signs of injury. Right lower leg: No edema. Left lower leg: No edema. Lymphadenopathy:      Cervical: Cervical adenopathy present. Skin:     General: Skin is warm and dry. Findings: No erythema or rash. Neurological:      General: No focal deficit present. Mental Status: He is alert and oriented to person, place, and time. Sensory: No sensory deficit. Gait: Gait normal.   Psychiatric:         Mood and Affect: Mood normal.         Behavior: Behavior normal.         Thought Content: Thought content normal.         Judgment: Judgment normal.           Assessment & Plan:     1.  Tonsillitis        Warm compresses to face  Push fluids   nsaids are ok may consider taking prilosec bid while on tx  Discussed probiotics and sinus rinses with patient  Watch for side effects of cleocin  Follow up if any problems  Answered his questions    MARK Chun  10/31/2020 10:41 AM EDT    (Pleasenote that portions of this note were completed with a voice recognition program.Efforts were made to edit the dictations but occasionally words are mis-transcribed.)

## 2020-11-01 LAB
CULTURE: NORMAL
Lab: NORMAL
SPECIMEN DESCRIPTION: NORMAL

## 2021-08-11 RX ORDER — OMEPRAZOLE 20 MG/1
CAPSULE, DELAYED RELEASE ORAL
Qty: 30 CAPSULE | Refills: 0 | Status: CANCELLED | OUTPATIENT
Start: 2021-08-11

## 2021-08-11 RX ORDER — NEBIVOLOL 2.5 MG/1
TABLET ORAL
Qty: 30 TABLET | Refills: 0 | Status: CANCELLED | OUTPATIENT
Start: 2021-08-11

## 2021-08-11 NOTE — TELEPHONE ENCOUNTER
Debbi Fragoso is requesting a refill on the following medication(s):  Requested Prescriptions     Pending Prescriptions Disp Refills    nebivolol (BYSTOLIC) 2.5 MG tablet 90 tablet 3     Sig: TAKE ONE-HALF (1/2) TO ONE TABLET DAILY    omeprazole (PRILOSEC) 20 MG delayed release capsule 90 capsule 1       Last Visit Date (If Applicable):  19/60/2594    Next Visit Date:    8/24/2021

## 2021-08-12 RX ORDER — NEBIVOLOL 2.5 MG/1
TABLET ORAL
Qty: 90 TABLET | Refills: 0 | Status: SHIPPED | OUTPATIENT
Start: 2021-08-12 | End: 2021-11-09 | Stop reason: SDUPTHER

## 2021-08-12 RX ORDER — OMEPRAZOLE 20 MG/1
CAPSULE, DELAYED RELEASE ORAL
Qty: 90 CAPSULE | Refills: 0 | Status: SHIPPED | OUTPATIENT
Start: 2021-08-12 | End: 2021-11-07 | Stop reason: SDUPTHER

## 2021-08-24 ENCOUNTER — OFFICE VISIT (OUTPATIENT)
Dept: FAMILY MEDICINE CLINIC | Age: 66
End: 2021-08-24
Payer: COMMERCIAL

## 2021-08-24 VITALS
BODY MASS INDEX: 31.15 KG/M2 | WEIGHT: 230 LBS | SYSTOLIC BLOOD PRESSURE: 122 MMHG | HEART RATE: 68 BPM | DIASTOLIC BLOOD PRESSURE: 80 MMHG | OXYGEN SATURATION: 95 % | RESPIRATION RATE: 20 BRPM | HEIGHT: 72 IN

## 2021-08-24 DIAGNOSIS — K22.2 SCHATZKI'S RING OF DISTAL ESOPHAGUS: ICD-10-CM

## 2021-08-24 DIAGNOSIS — M70.61 GREATER TROCHANTERIC BURSITIS OF RIGHT HIP: ICD-10-CM

## 2021-08-24 DIAGNOSIS — M20.41 HAMMER TOE OF RIGHT FOOT: ICD-10-CM

## 2021-08-24 DIAGNOSIS — M79.671 RIGHT FOOT PAIN: ICD-10-CM

## 2021-08-24 DIAGNOSIS — I10 ESSENTIAL HYPERTENSION: Primary | ICD-10-CM

## 2021-08-24 PROCEDURE — 99214 OFFICE O/P EST MOD 30 MIN: CPT | Performed by: FAMILY MEDICINE

## 2021-08-24 RX ORDER — CELECOXIB 200 MG/1
200 CAPSULE ORAL DAILY
Qty: 90 CAPSULE | Refills: 1 | Status: SHIPPED | OUTPATIENT
Start: 2021-08-24

## 2021-08-24 ASSESSMENT — ENCOUNTER SYMPTOMS
ABDOMINAL PAIN: 0
SHORTNESS OF BREATH: 0

## 2021-08-24 NOTE — PROGRESS NOTES
sounds: No murmur heard. Pulmonary:      Effort: Pulmonary effort is normal. No respiratory distress. Abdominal:      General: Bowel sounds are normal.   Musculoskeletal:         General: Tenderness (right foot metatarsal and ray tenderness.) present. No swelling. Right upper leg: Bony tenderness (trochanteric bursitis) present. Left lower leg: Normal.   Neurological:      Mental Status: He is alert. Left hip xray 2017 mild arthritis changes    Assessment:     1. Essential hypertension    2. Schatzki's ring of distal esophagus    3. BMI 31.0-31.9,adult    4. Hammer toe of right foot    5. Right foot pain    6. Greater trochanteric bursitis of right hip      No results found for this visit on 08/24/21. Plan:     Orders Placed This Encounter   Procedures    XR HIP RIGHT (2-3 VIEWS)     Standing Status:   Future     Standing Expiration Date:   8/24/2022    XR FOOT RIGHT (MIN 3 VIEWS)     Standing Status:   Future     Standing Expiration Date:   8/24/2022     Order Specific Question:   Reason for exam:     Answer:   1-3rd metatarsals    Amb External Referral To Physical Therapy     Referral Priority:   Routine     Referral Type:   Physical Therapy     Referral Reason:   Specialty Services Required     Referred to Provider:   Halina Chambers     Requested Specialty:   Physical Therapy     Number of Visits Requested:   Carlos Woods DPM, Podiatry, [place]     Referral Priority:   Routine     Referral Type:   Eval and Treat     Referral Reason:   Specialty Services Required     Referred to Provider:   Bill Angela DPM     Requested Specialty:   Podiatry     Number of Visits Requested:   1       Return in about 6 months (around 2/24/2022), or 6-12 months, for HTN. Patient Instructions   Flu vaccine and pneumovax 23 when desired       Discussed use, benefit, and side effects of prescribed medications. All patient questions answered. Pt voiced understanding. Reviewed health maintenance pneumovax 23 and flu vaccine this fall. Instructed to continue current medications, diet and exercise. Patient agreed with treatment plan. Follow up as directed.      Electronically signed by Sharmin Alicea MD on 8/24/2021

## 2021-10-13 ENCOUNTER — OFFICE VISIT (OUTPATIENT)
Dept: PODIATRY | Age: 66
End: 2021-10-13
Payer: COMMERCIAL

## 2021-10-13 VITALS
BODY MASS INDEX: 32.22 KG/M2 | DIASTOLIC BLOOD PRESSURE: 72 MMHG | SYSTOLIC BLOOD PRESSURE: 128 MMHG | HEART RATE: 80 BPM | WEIGHT: 237.6 LBS | RESPIRATION RATE: 20 BRPM

## 2021-10-13 DIAGNOSIS — M77.8 CAPSULITIS OF FOOT, RIGHT: Primary | ICD-10-CM

## 2021-10-13 DIAGNOSIS — M21.961 DEFORMITY OF METATARSAL, RIGHT: ICD-10-CM

## 2021-10-13 DIAGNOSIS — M79.671 FOOT PAIN, RIGHT: ICD-10-CM

## 2021-10-13 PROCEDURE — 99203 OFFICE O/P NEW LOW 30 MIN: CPT | Performed by: PODIATRIST

## 2021-10-13 RX ORDER — METHYLPREDNISOLONE 4 MG/1
TABLET ORAL
Qty: 1 KIT | Refills: 0 | Status: SHIPPED | OUTPATIENT
Start: 2021-10-13

## 2021-10-13 NOTE — PROGRESS NOTES
Subjective:  Brita Mcardle is a 77 y.o. male who presents to the office today complaining of pain in the ball of the Right foot. Symptoms began 6 month(s) ago. 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 Patient relates pain is Present. Pain is rated 3 out of 10 and is described as intermittent. Treatments prior to today's visit include: xrays, celebrex. Currently denies F/C/N/V. Allergies   Allergen Reactions    Gabapentin Other (See Comments)     Dizziness reported    Augmentin [Amoxicillin-Pot Clavulanate] Rash       Past Medical History:   Diagnosis Date    GERD (gastroesophageal reflux disease)     Hypertension        Prior to Admission medications    Medication Sig Start Date End Date Taking?  Authorizing Provider   celecoxib (CELEBREX) 200 MG capsule Take 1 capsule by mouth daily 8/24/21  Yes Wilfrido Mendez MD   nebivolol (BYSTOLIC) 2.5 MG tablet TAKE ONE-HALF (1/2) TO ONE TABLET DAILY 8/12/21  Yes Wilfrido Mendez MD   omeprazole (PRILOSEC) 20 MG delayed release capsule TAKE 1 CAPSULE DAILY 8/12/21  Yes Wilfrido Mendez MD   Omega-3 Fatty Acids (FISH OIL) 1000 MG CAPS Take 3,000 mg by mouth 3 times daily   Yes Historical Provider, MD   magnesium (MAGNESIUM-OXIDE) 250 MG TABS tablet Take 250 mg by mouth daily   Yes Historical Provider, MD   aspirin 81 MG EC tablet Take 81 mg by mouth daily   Yes Historical Provider, MD   vitamin D (CHOLECALCIFEROL) 1000 UNIT TABS tablet Take 1,000 Units by mouth daily   Yes Historical Provider, MD   Ubiquinol (QUNOL COQ10/UBIQUINOL/MANSOOR) 100 MG CAPS Take by mouth   Yes Historical Provider, MD       Past Surgical History:   Procedure Laterality Date    COLONOSCOPY      3/07; 1/9/17    SHOULDER SURGERY      UPPER GASTROINTESTINAL ENDOSCOPY  02/10/2017    stricture and dilation       Family History   Problem Relation Age of Onset    Breast Cancer Mother    [employer] Other Mother         irregular heart rate    Heart Disease Father 58    Atrial Fibrillation Father     Diabetes Father     Coronary Art Dis Father     Diabetes Maternal Grandmother     Diabetes Paternal Grandfather        Social History     Tobacco Use    Smoking status: Never Smoker    Smokeless tobacco: Never Used   Substance Use Topics    Alcohol use: Yes     Comment: very occ       ROS: All 14 ROS systems reviewed and pertinent positives noted above, all others negative. Lower Extremity Physical Examination:     Vitals:   Vitals:    10/13/21 1355   BP: 128/72   Pulse: 80   Resp: 20     General: AAO x 3 in NAD. Vascular: DP and PT pulses palpable 2/4, bilateral.  CFT <3 seconds, bilateral.  Hair growth present to the level of the digits, bilateral.  Edema absent, bilateral.  Varicosities absent, bilateral. Erythema absent, bilateral. Distal Rubor absent bilateral.  Temperature within normal limits bilateral. Hyperpigmentation absent bilateral. No atrophic skin. Neurological: Sensation intact to light touch to level of digits, bilateral.  Protective sensation intact 10/10 sites via 5.07/10g [place] Monofilament, bilateral.  negative Tinel's, bilateral.  negative Valleix sign, bilateral.  Vibratory intact bilateral.  Reflexes Decreased bilateral.  Paresthesias negative. Dysthesias negative. Sharp/dull intact bilateral.    Musculoskeletal: Muscle strength 5/5, Bilateral.  Pain present upon palpation of 2 metatarsal heads, Right.  negative lachman test.  within normal limits medial longitudinal arch, Bilateral.  Hypermobility at 1st metatarsal-cuneiform joint is negative. Ankle ROM within normal limits,Bilateral.  1st MPJ ROM within normal limits, Bilateral.  Dorsally contracted digits absent digits none, Bilateral. Other foot deformities none. Integument: Warm, dry, supple, bilateral.  Open lesion absent, bilateral.  Interdigital maceration absent to web spaces bilateral.  Nails within normal limits.   Fissures absent, bilateral. Hyperkeratotic tissue is absent. Xray: No acute changes area pain second ray. No degenerative changes second MPJ. There is noted to be a short first metatarsal.        Asessment: Patient is a 77 y.o. male with:    Diagnosis Orders   1. Capsulitis of foot, right     2. Deformity of metatarsal, right     3. Foot pain, right         Plan: Patient examined and evaluated. Current condition and treatment options discussed in detail. Appropriate offloading discussed. Prefabricated insoles with offloading pads advised at all times WB. Advised pt to use OTC anti inflammatory on a regular basis. X rays reviewed with the pt in detail. All questions answered. Orders Placed This Encounter   Medications    methylPREDNISolone (MEDROL DOSEPACK) 4 MG tablet     Sig: Take by mouth. Dispense:  1 kit     Refill:  0     Due to level of pain/deformity, it is in my medical opinion that patient will benefit from and is medically necessary for pre talon orthotics at this time. Pt was given the option of pre fabricated insoles. Pt was advised on appropriate use and how they can help their condition. Pt should use these in shoe gear 100% of the time WB. Patient level medical decision making overall. Patient 1 test reviewed from other provider. He is low risk for impaired with current treatment course. Contact office with any questions/problems/concerns. RTC in 3week(s).

## 2021-11-07 RX ORDER — OMEPRAZOLE 20 MG/1
CAPSULE, DELAYED RELEASE ORAL
Qty: 90 CAPSULE | Refills: 1 | Status: SHIPPED | OUTPATIENT
Start: 2021-11-07 | End: 2022-08-04 | Stop reason: SDUPTHER

## 2021-11-09 RX ORDER — NEBIVOLOL 2.5 MG/1
TABLET ORAL
Qty: 90 TABLET | Refills: 1 | Status: SHIPPED | OUTPATIENT
Start: 2021-11-09 | End: 2022-08-04 | Stop reason: SDUPTHER

## 2022-08-04 ENCOUNTER — OFFICE VISIT (OUTPATIENT)
Dept: FAMILY MEDICINE CLINIC | Age: 67
End: 2022-08-04
Payer: MEDICARE

## 2022-08-04 VITALS
BODY MASS INDEX: 33.23 KG/M2 | HEART RATE: 72 BPM | WEIGHT: 245 LBS | DIASTOLIC BLOOD PRESSURE: 76 MMHG | SYSTOLIC BLOOD PRESSURE: 134 MMHG | OXYGEN SATURATION: 96 %

## 2022-08-04 DIAGNOSIS — Z13.220 LIPID SCREENING: ICD-10-CM

## 2022-08-04 DIAGNOSIS — Z12.5 SCREENING PSA (PROSTATE SPECIFIC ANTIGEN): ICD-10-CM

## 2022-08-04 DIAGNOSIS — I10 ESSENTIAL HYPERTENSION: ICD-10-CM

## 2022-08-04 DIAGNOSIS — Z76.89 ESTABLISHING CARE WITH NEW DOCTOR, ENCOUNTER FOR: Primary | ICD-10-CM

## 2022-08-04 DIAGNOSIS — K21.9 GASTROESOPHAGEAL REFLUX DISEASE WITHOUT ESOPHAGITIS: ICD-10-CM

## 2022-08-04 PROCEDURE — 99214 OFFICE O/P EST MOD 30 MIN: CPT

## 2022-08-04 PROCEDURE — 1123F ACP DISCUSS/DSCN MKR DOCD: CPT

## 2022-08-04 RX ORDER — CHOLECALCIFEROL (VITAMIN D3) 25 MCG
TABLET,CHEWABLE ORAL
COMMUNITY

## 2022-08-04 RX ORDER — NEBIVOLOL 2.5 MG/1
TABLET ORAL
Qty: 90 TABLET | Refills: 1 | Status: SHIPPED | OUTPATIENT
Start: 2022-08-04

## 2022-08-04 RX ORDER — OMEPRAZOLE 20 MG/1
CAPSULE, DELAYED RELEASE ORAL
Qty: 90 CAPSULE | Refills: 1 | Status: SHIPPED | OUTPATIENT
Start: 2022-08-04

## 2022-08-04 RX ORDER — DIMENHYDRINATE 50 MG
TABLET ORAL
COMMUNITY

## 2022-08-04 ASSESSMENT — ENCOUNTER SYMPTOMS
COLOR CHANGE: 0
NAUSEA: 0
SINUS PAIN: 0
WHEEZING: 0
CONSTIPATION: 0
EYE ITCHING: 0
BACK PAIN: 0
RHINORRHEA: 0
SINUS PRESSURE: 0
EYE DISCHARGE: 0
ABDOMINAL PAIN: 0
SHORTNESS OF BREATH: 0
CHEST TIGHTNESS: 0
COUGH: 0
DIARRHEA: 0

## 2022-08-04 ASSESSMENT — PATIENT HEALTH QUESTIONNAIRE - PHQ9: DEPRESSION UNABLE TO ASSESS: PT REFUSES

## 2022-08-04 NOTE — ASSESSMENT & PLAN NOTE
At goal, continue current medications and continue current treatment plan continue diet low in acid, continue omeprazole 20 mg delayed release capsules once daily. Take this first thing the morning with sips of water and nothing else in your stomach. Wait 45 minutes before taking the rest your medications are beginning to eat.

## 2022-08-04 NOTE — ASSESSMENT & PLAN NOTE
At goal, continue current medications and continue current treatment plan Bystolic 2.5 mg tablets take 1/2 to 1 tablet daily. Blood pressure in office today is within goal range.

## 2022-08-04 NOTE — PROGRESS NOTES
Meño Payan (:  1955) is a 79 y.o. male,Established patient, here for evaluation of the following chief complaint(s):  Hypertension (Patient is here today to establish and medication refills. ) and Foreign Body in Eye (Patient can see something small go across his left eye sometimes. )         ASSESSMENT/PLAN:  1. Establishing care with new doctor, encounter for  -     Hemoglobin A1C; Future  2. Essential hypertension  Assessment & Plan:   At goal, continue current medications and continue current treatment plan Bystolic 2.5 mg tablets take 1/2 to 1 tablet daily. Blood pressure in office today is within goal range. Orders:  -     nebivolol (BYSTOLIC) 2.5 MG tablet; TAKE ONE-HALF (1/2) TO ONE TABLET DAILY, Disp-90 tablet, R-1Normal  -     Comprehensive Metabolic Panel, Fasting; Future  3. Gastroesophageal reflux disease without esophagitis  Assessment & Plan:   At goal, continue current medications and continue current treatment plan continue diet low in acid, continue omeprazole 20 mg delayed release capsules once daily. Take this first thing the morning with sips of water and nothing else in your stomach. Wait 45 minutes before taking the rest your medications are beginning to eat. Orders:  -     omeprazole (PRILOSEC) 20 MG delayed release capsule; TAKE 1 CAPSULE DAILY, Disp-90 capsule, R-1Normal  4. Screening PSA (prostate specific antigen)  -     PSA Screening; Future  5. Lipid screening  -     Lipid, Fasting; Future      Return in about 6 months (around 2023), or if symptoms worsen or fail to improve. Subjective   SUBJECTIVE/OBJECTIVE:  Christopher Gupta is here today to establish a PCP. All past notes, H&P, lab results that are burned to his care reviewed at time of appointment. Christopher Gupta has a history of hypertension and GERD, he does take Bystolic 1/2 tablet to 1 tablet every day, and reports good efficacy and denies side effects.   He does have GERD and uses omeprazole 20 mg delayed-release tablets which he takes in the morning. How to use this medication is reviewed with Romayne Canter at this time. He denies any further questions. Screening blood work and health maintenance is reviewed, will update PSA, lipid panel, CMP, A1c. He tells me he was getting out of physicals and believes that these all should be within normal limits. He is agreeable to this plan of care and denies any further questions, he does have right knee intermittent pain. This is from a past injury, he does not want this examined or further work-up at this time. He is encouraged if this changes or becomes worse in any way to make an appointment. Review of Systems   Constitutional:  Negative for chills, fatigue, fever and unexpected weight change. HENT:  Negative for congestion, ear pain, rhinorrhea, sinus pressure and sinus pain. Eyes:  Negative for discharge, itching and visual disturbance. Respiratory:  Negative for cough, chest tightness, shortness of breath and wheezing. Cardiovascular:  Negative for chest pain, palpitations and leg swelling. Gastrointestinal:  Negative for abdominal pain, constipation, diarrhea and nausea. Endocrine: Negative for cold intolerance, heat intolerance, polydipsia and polyphagia. Genitourinary:  Negative for dysuria, flank pain and frequency. Musculoskeletal:  Negative for arthralgias, back pain and myalgias. Skin:  Negative for color change. Allergic/Immunologic: Negative for environmental allergies and food allergies. Neurological:  Negative for dizziness, weakness, light-headedness, numbness and headaches. Psychiatric/Behavioral:  Negative for agitation, behavioral problems and confusion. The patient is not nervous/anxious. Objective   Physical Exam  Vitals and nursing note reviewed. Constitutional:       General: He is not in acute distress. Appearance: Normal appearance. He is not ill-appearing. HENT:      Head: Normocephalic and atraumatic. Right Ear: Tympanic membrane and external ear normal.      Left Ear: Tympanic membrane and external ear normal.      Nose: Nose normal. No congestion or rhinorrhea. Mouth/Throat:      Mouth: Mucous membranes are moist.      Pharynx: Oropharynx is clear. No posterior oropharyngeal erythema. Eyes:      Pupils: Pupils are equal, round, and reactive to light. Cardiovascular:      Rate and Rhythm: Normal rate and regular rhythm. Pulses: Normal pulses. Heart sounds: Normal heart sounds. Pulmonary:      Effort: Pulmonary effort is normal.      Breath sounds: Normal breath sounds. No wheezing or rhonchi. Abdominal:      General: Bowel sounds are normal.      Palpations: There is no mass. Tenderness: There is no abdominal tenderness. Musculoskeletal:         General: No swelling or tenderness. Normal range of motion. Cervical back: Normal range of motion. No rigidity or tenderness. Skin:     General: Skin is warm and dry. Capillary Refill: Capillary refill takes less than 2 seconds. Coloration: Skin is not pale. Findings: No erythema. Neurological:      General: No focal deficit present. Mental Status: He is alert and oriented to person, place, and time. Psychiatric:         Mood and Affect: Mood normal.         Behavior: Behavior normal.         Thought Content: Thought content normal.         Judgment: Judgment normal.                An electronic signature was used to authenticate this note.     --Berenice Rogers, JAVIER - CNP

## 2022-08-16 LAB
ALBUMIN/GLOBULIN RATIO: 1.5 G/DL
ALBUMIN: 4.1 G/DL (ref 3.5–5)
ALP BLD-CCNC: 49 UNITS/L (ref 38–126)
ALT SERPL-CCNC: 30 UNITS/L (ref 4–50)
ANION GAP SERPL CALCULATED.3IONS-SCNC: 4.7 MMOL/L
AST SERPL-CCNC: 35 UNITS/L (ref 17–59)
BILIRUB SERPL-MCNC: 1 MG/DL (ref 0.2–1.3)
BUN BLDV-MCNC: 19 MG/DL (ref 9–20)
CALCIUM SERPL-MCNC: 9.2 MG/DL (ref 8.4–10.2)
CHLORIDE BLD-SCNC: 105 MMOL/L (ref 98–120)
CHOLESTEROL/HDL RATIO: 4.23 RATIO (ref 0–4.5)
CHOLESTEROL: 203 MG/DL (ref 50–200)
CO2: 29 MMOL/L (ref 22–31)
CREAT SERPL-MCNC: 1 MG/DL (ref 0.7–1.3)
DIAGNOSTIC PSA: 2.95 NG/ML (ref 0–4)
GFR CALCULATED: > 60
GLOBULIN: 2.7 G/DL
GLUCOSE: 96 MG/DL (ref 75–110)
HBA1C MFR BLD: 6.1 % (ref 4.4–6.4)
HDLC SERPL-MCNC: 48 MG/DL (ref 36–68)
LDL CHOLESTEROL CALCULATED: 134 MG/DL (ref 0–160)
POTASSIUM SERPL-SCNC: 4.4 MMOL/L (ref 3.6–5)
SODIUM BLD-SCNC: 139 MMOL/L (ref 135–145)
TOTAL PROTEIN, SERUM: 6.8 G/DL (ref 6.3–8.2)
TRIGL SERPL-MCNC: 105 MG/DL (ref 10–250)
VLDLC SERPL CALC-MCNC: 21 MG/DL (ref 0–50)

## 2022-12-20 ENCOUNTER — OFFICE VISIT (OUTPATIENT)
Dept: FAMILY MEDICINE CLINIC | Age: 67
End: 2022-12-20
Payer: MEDICARE

## 2022-12-20 VITALS
HEART RATE: 69 BPM | OXYGEN SATURATION: 97 % | SYSTOLIC BLOOD PRESSURE: 138 MMHG | BODY MASS INDEX: 33.48 KG/M2 | HEIGHT: 72 IN | WEIGHT: 247.2 LBS | DIASTOLIC BLOOD PRESSURE: 88 MMHG

## 2022-12-20 DIAGNOSIS — Z00.00 WELCOME TO MEDICARE PREVENTIVE VISIT: ICD-10-CM

## 2022-12-20 DIAGNOSIS — Z23 NEED FOR INFLUENZA VACCINATION: Primary | ICD-10-CM

## 2022-12-20 DIAGNOSIS — K21.9 GASTROESOPHAGEAL REFLUX DISEASE WITHOUT ESOPHAGITIS: ICD-10-CM

## 2022-12-20 DIAGNOSIS — I73.9 PERIPHERAL VASCULAR DISEASE (HCC): ICD-10-CM

## 2022-12-20 DIAGNOSIS — R09.81 NASAL CONGESTION: ICD-10-CM

## 2022-12-20 DIAGNOSIS — I10 ESSENTIAL HYPERTENSION: ICD-10-CM

## 2022-12-20 PROCEDURE — 1123F ACP DISCUSS/DSCN MKR DOCD: CPT

## 2022-12-20 PROCEDURE — 3017F COLORECTAL CA SCREEN DOC REV: CPT

## 2022-12-20 PROCEDURE — 3078F DIAST BP <80 MM HG: CPT

## 2022-12-20 PROCEDURE — 99212 OFFICE O/P EST SF 10 MIN: CPT

## 2022-12-20 PROCEDURE — G0402 INITIAL PREVENTIVE EXAM: HCPCS

## 2022-12-20 PROCEDURE — 3074F SYST BP LT 130 MM HG: CPT

## 2022-12-20 RX ORDER — FLUTICASONE PROPIONATE 50 MCG
2 SPRAY, SUSPENSION (ML) NASAL DAILY
Qty: 32 G | Refills: 1 | Status: SHIPPED | OUTPATIENT
Start: 2022-12-20

## 2022-12-20 RX ORDER — NEBIVOLOL 2.5 MG/1
TABLET ORAL
Qty: 90 TABLET | Refills: 1 | Status: SHIPPED | OUTPATIENT
Start: 2022-12-20

## 2022-12-20 RX ORDER — OMEPRAZOLE 20 MG/1
CAPSULE, DELAYED RELEASE ORAL
Qty: 90 CAPSULE | Refills: 1 | Status: SHIPPED | OUTPATIENT
Start: 2022-12-20

## 2022-12-20 SDOH — ECONOMIC STABILITY: FOOD INSECURITY: WITHIN THE PAST 12 MONTHS, THE FOOD YOU BOUGHT JUST DIDN'T LAST AND YOU DIDN'T HAVE MONEY TO GET MORE.: NEVER TRUE

## 2022-12-20 SDOH — ECONOMIC STABILITY: FOOD INSECURITY: WITHIN THE PAST 12 MONTHS, YOU WORRIED THAT YOUR FOOD WOULD RUN OUT BEFORE YOU GOT MONEY TO BUY MORE.: NEVER TRUE

## 2022-12-20 ASSESSMENT — PATIENT HEALTH QUESTIONNAIRE - PHQ9
SUM OF ALL RESPONSES TO PHQ QUESTIONS 1-9: 0
2. FEELING DOWN, DEPRESSED OR HOPELESS: 0
1. LITTLE INTEREST OR PLEASURE IN DOING THINGS: 0
SUM OF ALL RESPONSES TO PHQ QUESTIONS 1-9: 0
SUM OF ALL RESPONSES TO PHQ9 QUESTIONS 1 & 2: 0

## 2022-12-20 ASSESSMENT — SOCIAL DETERMINANTS OF HEALTH (SDOH): HOW HARD IS IT FOR YOU TO PAY FOR THE VERY BASICS LIKE FOOD, HOUSING, MEDICAL CARE, AND HEATING?: NOT HARD AT ALL

## 2022-12-20 ASSESSMENT — LIFESTYLE VARIABLES
HOW MANY STANDARD DRINKS CONTAINING ALCOHOL DO YOU HAVE ON A TYPICAL DAY: 1 OR 2
HOW OFTEN DO YOU HAVE A DRINK CONTAINING ALCOHOL: MONTHLY OR LESS

## 2022-12-20 NOTE — PROGRESS NOTES
Medicare Annual Wellness Visit    Bridgett Hernandez is here for Medicare AWV (Pt is here for a Medicare AWV. Pt states he had COVID 3 weeks ago and he just still has some sinus issues from it. )    Assessment & Plan   Need for influenza vaccination  Gastroesophageal reflux disease without esophagitis  -     omeprazole (PRILOSEC) 20 MG delayed release capsule; TAKE 1 CAPSULE DAILY, Disp-90 capsule, R-1Normal  Essential hypertension  Assessment & Plan:     Orders:  -     nebivolol (BYSTOLIC) 2.5 MG tablet; TAKE ONE-HALF (1/2) TO ONE TABLET DAILY, Disp-90 tablet, R-1Normal  Welcome to Medicare preventive visit  Nasal congestion  -     fluticasone (FLONASE) 50 MCG/ACT nasal spray; 2 sprays by Each Nostril route daily, Disp-32 g, R-1Normal  Peripheral vascular disease ([address]      Recommendations for Preventive Services Due: see orders and patient instructions/AVS.  Recommended screening schedule for the next 5-10 years is provided to the patient in written form: see Patient Instructions/AVS.     Return in 6 months (on 6/20/2023), or if symptoms worsen or fail to improve, for Medicare Annual Wellness Visit in 1 year. Subjective   The following acute and/or chronic problems were also addressed today:  Hypertension, hyperlipidemia, GERD, peripheral vascular disease. Patient's complete Health Risk Assessment and screening values have been reviewed and are found in Flowsheets. The following problems were reviewed today and where indicated follow up appointments were made and/or referrals ordered.     Positive Risk Factor Screenings with Interventions:                 Weight and Activity:  Physical Activity: Insufficiently Active    Days of Exercise per Week: 4 days    Minutes of Exercise per Session: 10 min     On average, how many days per week do you engage in moderate to strenuous exercise (like a brisk walk)?: 4 days  Have you lost any weight without trying in the past 3 months?: No  Body mass index: (!) 33.52    Obesity Interventions:  See AVS for additional education material  See A/P for plan and any pertinent orders             Safety:  Do you have either shower bars, grab bars, non-slip mats or non-slip surfaces in your shower or bathtub?: (!) No  Interventions:  Patient declined any further interventions or treatment                     Objective   Vitals:    12/20/22 1425   BP: 138/88   Site: Left Upper Arm   Position: Sitting   Cuff Size: Medium Adult   Pulse: 69   SpO2: 97%   Weight: 247 lb 3.2 oz (112.1 kg)   Height: 6' (1.829 m)      Body mass index is 33.53 kg/m². General Appearance: alert and oriented to person, place and time, well developed and well- nourished, in no acute distress  Skin: warm and dry, no rash or erythema  Head: normocephalic and atraumatic  Eyes: pupils equal, round, and reactive to light, extraocular eye movements intact, conjunctivae normal  ENT: tympanic membrane, external ear and ear canal normal bilaterally, nose without deformity, nasal mucosa and turbinates normal without polyps  Neck: supple and non-tender without mass, no thyromegaly or thyroid nodules, no cervical lymphadenopathy  Pulmonary/Chest: clear to auscultation bilaterally- no wheezes, rales or rhonchi, normal air movement, no respiratory distress  Cardiovascular: normal rate, regular rhythm, normal S1 and S2, no murmurs, rubs, clicks, or gallops, distal pulses intact, no carotid bruits  Abdomen: soft, non-tender, non-distended, normal bowel sounds, no masses or organomegaly  Extremities: no cyanosis, clubbing or edema  Musculoskeletal: normal range of motion, no joint swelling, deformity or tenderness  Neurologic: reflexes normal and symmetric, no cranial nerve deficit, gait, coordination and speech normal       Allergies   Allergen Reactions    Gabapentin Other (See Comments)     Dizziness reported    Augmentin [Amoxicillin-Pot Clavulanate] Rash     Prior to Visit Medications    Medication Sig Taking?  Authorizing Provider omeprazole (PRILOSEC) 20 MG delayed release capsule TAKE 1 CAPSULE DAILY Yes JAVIER Alejandre CNP   nebivolol (BYSTOLIC) 2.5 MG tablet TAKE ONE-HALF (1/2) TO ONE TABLET DAILY Yes JAVIER Alejandre CNP   fluticasone (FLONASE) 50 MCG/ACT nasal spray 2 sprays by Each Nostril route daily Yes JAVIER Alejandre CNP   Coenzyme Q10 (CO Q-10) 100 MG CAPS Take by mouth Yes Historical Provider, MD   Potassium 99 MG TABS Take by mouth Yes Historical Provider, MD   ACETYLCARN-ALPHA LIPOIC ACID PO Take 1,200 mg by mouth Yes Historical Provider, MD   ARGININE PO Take 1,000 mg by mouth Yes Historical Provider, MD   Omega-3 Fatty Acids (FISH OIL) 1000 MG CAPS Take 3,000 mg by mouth 3 times daily Yes Historical Provider, MD   magnesium (MAGNESIUM-OXIDE) 250 MG TABS tablet Take 400 mg by mouth in the morning. Yes Historical Provider, MD   aspirin 81 MG EC tablet Take 81 mg by mouth daily Yes Historical Provider, MD   vitamin D (CHOLECALCIFEROL) 1000 UNIT TABS tablet Take 2,000 Units by mouth in the morning.  Yes Historical Provider, MD   Ubiquinol 100 MG CAPS Take by mouth Yes Historical Provider, MD   Cyanocobalamin (B-12) 1000 MCG CAPS Take by mouth  Patient not taking: Reported on 12/20/2022  Historical Provider, MD [place] (Including outside providers/suppliers regularly involved in providing care):   Patient Care Team:  JAVIER Alejandre CNP as PCP - General (Nurse Practitioner)  JAVIER Alejandre CNP as PCP - REHABILITATION [facility] Empaneled Provider     Reviewed and updated this visit:  Tobacco  Allergies  Meds  Problems  Med Hx  Surg Hx  Soc Hx  Fam Hx

## 2022-12-20 NOTE — PATIENT INSTRUCTIONS
Advance Directives: Care Instructions  Overview  An advance directive is a legal way to state your wishes at the end of your life. It tells your family and your doctor what to do if you can't say what you want. There are two main types of advance directives. You can change them any time your wishes change. Living will. This form tells your family and your doctor your wishes about life support and other treatment. The form is also called a declaration. Medical power of . This form lets you name a person to make treatment decisions for you when you can't speak for yourself. This person is called a health care agent (health care proxy, health care surrogate). The form is also called a durable power of  for health care. If you do not have an advance directive, decisions about your medical care may be made by a family member, or by a doctor or a  who doesn't know you. It may help to think of an advance directive as a gift to the people who care for you. If you have one, they won't have to make tough decisions by themselves. For more information, including forms for your state, see the [address] website (www.caringinfo.org/planning/advance-directives/). Follow-up care is a key part of your treatment and safety. Be sure to make and go to all appointments, and call your doctor if you are having problems. It's also a good idea to know your test results and keep a list of the medicines you take. What should you include in an advance directive? Many states have a unique advance directive form. (It may ask you to address specific issues.) Or you might use a universal form that's approved by many states. If your form doesn't tell you what to address, it may be hard to know what to include in your advance directive. Use the questions below to help you get started. Who do you want to make decisions about your medical care if you are not able to?   What life-support measures do you want if you have a serious illness that gets worse over time or can't be cured? What are you most afraid of that might happen? (Maybe you're afraid of having pain, losing your independence, or being kept alive by machines.)  Where would you prefer to die? (Your home? A hospital? A nursing home?)  Do you want to donate your organs when you die? Do you want certain Orthodoxy practices performed before you die? When should you call for help? Be sure to contact your doctor if you have any questions. Where can you learn more? Go to http://www.alston.com/ and enter R264 to learn more about \"Advance Directives: Care Instructions. \"  Current as of: June 16, 2022               Content Version: 13.5  © 9170-1027 [employer]. Care instructions adapted under license by [facility] [place] ([facility]). If you have questions about a medical condition or this instruction, always ask your healthcare professional. [place] [address] any warranty or liability for your use of this information. Personalized Preventive Plan for Adin Soriano - 12/20/2022  Medicare offers a range of preventive health benefits. Some of the tests and screenings are paid in full while other may be subject to a deductible, co-insurance, and/or copay. Some of these benefits include a comprehensive review of your medical history including lifestyle, illnesses that may run in your family, and various assessments and screenings as appropriate. After reviewing your medical record and screening and assessments performed today your provider may have ordered immunizations, labs, imaging, and/or referrals for you. A list of these orders (if applicable) as well as your Preventive Care list are included within your After Visit Summary for your review.     Other Preventive Recommendations:    A preventive eye exam performed by an eye specialist is recommended every 1-2 years to screen for glaucoma; cataracts, macular degeneration, and other eye disorders. A preventive dental visit is recommended every 6 months. Try to get at least 150 minutes of exercise per week or 10,000 steps per day on a pedometer . Order or download the FREE \"Exercise & Physical Activity: Your Everyday Guide\" from [employer]. Call 6-848.565.3682 or search [employer] online. You need 6824-1123 mg of calcium and 2111-6733 IU of vitamin D per day. It is possible to meet your calcium requirement with diet alone, but a vitamin D supplement is usually necessary to meet this goal.  When exposed to the sun, use a sunscreen that protects against both UVA and UVB radiation with an SPF of 30 or greater. Reapply every 2 to 3 hours or after sweating, drying off with a towel, or swimming. Always wear a seat belt when traveling in a car. Always wear a helmet when riding a bicycle or motorcycle.

## 2023-06-20 ENCOUNTER — OFFICE VISIT (OUTPATIENT)
Dept: FAMILY MEDICINE CLINIC | Age: 68
End: 2023-06-20
Payer: MEDICARE

## 2023-06-20 VITALS
DIASTOLIC BLOOD PRESSURE: 86 MMHG | SYSTOLIC BLOOD PRESSURE: 144 MMHG | BODY MASS INDEX: 34.18 KG/M2 | HEART RATE: 72 BPM | OXYGEN SATURATION: 97 % | WEIGHT: 252 LBS

## 2023-06-20 DIAGNOSIS — Z12.5 PROSTATE CANCER SCREENING: ICD-10-CM

## 2023-06-20 DIAGNOSIS — I10 ESSENTIAL (PRIMARY) HYPERTENSION: Primary | ICD-10-CM

## 2023-06-20 DIAGNOSIS — Z13.220 ENCOUNTER FOR LIPID SCREENING FOR CARDIOVASCULAR DISEASE: ICD-10-CM

## 2023-06-20 DIAGNOSIS — I73.9 PERIPHERAL VASCULAR DISEASE (HCC): ICD-10-CM

## 2023-06-20 DIAGNOSIS — Z13.6 ENCOUNTER FOR LIPID SCREENING FOR CARDIOVASCULAR DISEASE: ICD-10-CM

## 2023-06-20 PROCEDURE — 3017F COLORECTAL CA SCREEN DOC REV: CPT

## 2023-06-20 PROCEDURE — 3078F DIAST BP <80 MM HG: CPT

## 2023-06-20 PROCEDURE — 3074F SYST BP LT 130 MM HG: CPT

## 2023-06-20 PROCEDURE — 99214 OFFICE O/P EST MOD 30 MIN: CPT

## 2023-06-20 PROCEDURE — G8417 CALC BMI ABV UP PARAM F/U: HCPCS

## 2023-06-20 PROCEDURE — 1036F TOBACCO NON-USER: CPT

## 2023-06-20 PROCEDURE — 1123F ACP DISCUSS/DSCN MKR DOCD: CPT

## 2023-06-20 PROCEDURE — 99213 OFFICE O/P EST LOW 20 MIN: CPT

## 2023-06-20 PROCEDURE — G8427 DOCREV CUR MEDS BY ELIG CLIN: HCPCS

## 2023-06-20 SDOH — ECONOMIC STABILITY: FOOD INSECURITY: WITHIN THE PAST 12 MONTHS, THE FOOD YOU BOUGHT JUST DIDN'T LAST AND YOU DIDN'T HAVE MONEY TO GET MORE.: NEVER TRUE

## 2023-06-20 SDOH — ECONOMIC STABILITY: INCOME INSECURITY: HOW HARD IS IT FOR YOU TO PAY FOR THE VERY BASICS LIKE FOOD, HOUSING, MEDICAL CARE, AND HEATING?: NOT HARD AT ALL

## 2023-06-20 SDOH — ECONOMIC STABILITY: HOUSING INSECURITY
IN THE LAST 12 MONTHS, WAS THERE A TIME WHEN YOU DID NOT HAVE A STEADY PLACE TO SLEEP OR SLEPT IN A SHELTER (INCLUDING NOW)?: NO

## 2023-06-20 SDOH — ECONOMIC STABILITY: FOOD INSECURITY: WITHIN THE PAST 12 MONTHS, YOU WORRIED THAT YOUR FOOD WOULD RUN OUT BEFORE YOU GOT MONEY TO BUY MORE.: NEVER TRUE

## 2023-06-20 ASSESSMENT — PATIENT HEALTH QUESTIONNAIRE - PHQ9
SUM OF ALL RESPONSES TO PHQ QUESTIONS 1-9: 0
2. FEELING DOWN, DEPRESSED OR HOPELESS: 0
SUM OF ALL RESPONSES TO PHQ QUESTIONS 1-9: 0
1. LITTLE INTEREST OR PLEASURE IN DOING THINGS: 0
SUM OF ALL RESPONSES TO PHQ9 QUESTIONS 1 & 2: 0

## 2023-06-26 ASSESSMENT — ENCOUNTER SYMPTOMS
COLOR CHANGE: 0
ABDOMINAL PAIN: 0
EYE DISCHARGE: 0
SHORTNESS OF BREATH: 0
DIARRHEA: 0
CONSTIPATION: 0
WHEEZING: 0
EYE ITCHING: 0
RHINORRHEA: 0
SINUS PRESSURE: 0
CHEST TIGHTNESS: 0
SINUS PAIN: 0
COUGH: 0
BACK PAIN: 0
NAUSEA: 0

## 2023-08-21 DIAGNOSIS — K21.9 GASTROESOPHAGEAL REFLUX DISEASE WITHOUT ESOPHAGITIS: ICD-10-CM

## 2023-08-21 DIAGNOSIS — I10 ESSENTIAL HYPERTENSION: ICD-10-CM

## 2023-08-21 RX ORDER — NEBIVOLOL 2.5 MG/1
TABLET ORAL
Qty: 90 TABLET | Refills: 1 | Status: SHIPPED | OUTPATIENT
Start: 2023-08-21 | End: 2023-08-23 | Stop reason: SDUPTHER

## 2023-08-21 RX ORDER — OMEPRAZOLE 20 MG/1
CAPSULE, DELAYED RELEASE ORAL
Qty: 90 CAPSULE | Refills: 1 | Status: SHIPPED | OUTPATIENT
Start: 2023-08-21

## 2023-08-21 NOTE — TELEPHONE ENCOUNTER
Requested Prescriptions     Pending Prescriptions Disp Refills    omeprazole (PRILOSEC) 20 MG delayed release capsule 90 capsule 1     Sig: TAKE 1 CAPSULE DAILY    nebivolol (BYSTOLIC) 2.5 MG tablet 90 tablet 1     Sig: TAKE ONE-HALF (1/2) TO ONE TABLET DAILY

## 2023-08-23 DIAGNOSIS — I10 ESSENTIAL HYPERTENSION: ICD-10-CM

## 2023-08-23 RX ORDER — NEBIVOLOL 2.5 MG/1
TABLET ORAL
Qty: 90 TABLET | Refills: 1 | Status: SHIPPED | OUTPATIENT
Start: 2023-08-23

## 2023-08-23 NOTE — TELEPHONE ENCOUNTER
Requested Prescriptions     Pending Prescriptions Disp Refills    nebivolol (BYSTOLIC) 2.5 MG tablet 90 tablet 1     Sig: TAKE ONE-HALF (1/2) TO ONE TABLET DAILY      Pt stated that this medication costs $74 at [facility] in [place], and so he wants it sent to [facility] in [place] because it's cheaper there. Please advise.

## 2023-08-31 DIAGNOSIS — I10 ESSENTIAL HYPERTENSION: ICD-10-CM

## 2023-08-31 RX ORDER — NEBIVOLOL 2.5 MG/1
TABLET ORAL
Qty: 90 TABLET | Refills: 1 | Status: SHIPPED | OUTPATIENT
Start: 2023-08-31

## 2023-08-31 NOTE — TELEPHONE ENCOUNTER
Requested Prescriptions     Pending Prescriptions Disp Refills    nebivolol (BYSTOLIC) 2.5 MG tablet 90 tablet 1     Sig: TAKE ONE-HALF (1/2) TO ONE TABLET DAILY        Pt called stating that he wants this script sent to [facility] in [facility] in order to pay less than he would at [facility] in [place].

## 2023-09-07 ENCOUNTER — TELEPHONE (OUTPATIENT)
Dept: FAMILY MEDICINE CLINIC | Age: 68
End: 2023-09-07

## 2023-09-07 NOTE — TELEPHONE ENCOUNTER
Pt called states that he has been taking 1 tablet per day on his nebivolol and the directions day 1/2 tablet per day. Pt states his next refill needs to be changed to 1 tablet per day.

## 2023-10-05 ENCOUNTER — TELEPHONE (OUTPATIENT)
Dept: FAMILY MEDICINE CLINIC | Age: 68
End: 2023-10-05

## 2023-10-05 DIAGNOSIS — I10 ESSENTIAL HYPERTENSION: ICD-10-CM

## 2023-10-05 RX ORDER — NEBIVOLOL 2.5 MG/1
2.5 TABLET ORAL DAILY
Qty: 90 TABLET | Refills: 1
Start: 2023-10-05

## 2023-10-05 NOTE — TELEPHONE ENCOUNTER
Pt presented to the window to pay a bill and wanted to reiterate that his script for nebivolol should be 2.5mg and taking one tablet once daily.

## 2023-10-23 DIAGNOSIS — I10 ESSENTIAL HYPERTENSION: ICD-10-CM

## 2023-10-23 RX ORDER — NEBIVOLOL 2.5 MG/1
2.5 TABLET ORAL DAILY
Qty: 90 TABLET | Refills: 1 | Status: SHIPPED | OUTPATIENT
Start: 2023-10-23

## 2023-10-23 NOTE — TELEPHONE ENCOUNTER
Requested Prescriptions     Pending Prescriptions Disp Refills    nebivolol (BYSTOLIC) 2.5 MG tablet 90 tablet 1     Sig: Take 1 tablet by mouth daily

## 2023-12-20 ASSESSMENT — LIFESTYLE VARIABLES
HOW OFTEN DO YOU HAVE A DRINK CONTAINING ALCOHOL: 2
HOW OFTEN DO YOU HAVE SIX OR MORE DRINKS ON ONE OCCASION: 1
HOW MANY STANDARD DRINKS CONTAINING ALCOHOL DO YOU HAVE ON A TYPICAL DAY: 1

## 2023-12-21 ENCOUNTER — OFFICE VISIT (OUTPATIENT)
Dept: FAMILY MEDICINE CLINIC | Age: 68
End: 2023-12-21
Payer: MEDICARE

## 2023-12-21 VITALS
BODY MASS INDEX: 33.86 KG/M2 | HEART RATE: 67 BPM | HEIGHT: 72 IN | DIASTOLIC BLOOD PRESSURE: 82 MMHG | SYSTOLIC BLOOD PRESSURE: 124 MMHG | OXYGEN SATURATION: 97 % | WEIGHT: 250 LBS

## 2023-12-21 DIAGNOSIS — K21.9 GASTROESOPHAGEAL REFLUX DISEASE WITHOUT ESOPHAGITIS: ICD-10-CM

## 2023-12-21 DIAGNOSIS — Z00.00 INITIAL MEDICARE ANNUAL WELLNESS VISIT: Primary | ICD-10-CM

## 2023-12-21 PROCEDURE — 3079F DIAST BP 80-89 MM HG: CPT

## 2023-12-21 PROCEDURE — G0438 PPPS, INITIAL VISIT: HCPCS

## 2023-12-21 PROCEDURE — 3074F SYST BP LT 130 MM HG: CPT

## 2023-12-21 PROCEDURE — G8484 FLU IMMUNIZE NO ADMIN: HCPCS

## 2023-12-21 PROCEDURE — 1123F ACP DISCUSS/DSCN MKR DOCD: CPT

## 2023-12-21 PROCEDURE — 3017F COLORECTAL CA SCREEN DOC REV: CPT

## 2023-12-21 RX ORDER — OMEPRAZOLE 20 MG/1
CAPSULE, DELAYED RELEASE ORAL
Qty: 90 CAPSULE | Refills: 1 | Status: SHIPPED | OUTPATIENT
Start: 2023-12-21

## 2024-02-20 ENCOUNTER — HOSPITAL ENCOUNTER (OUTPATIENT)
Dept: ULTRASOUND IMAGING | Age: 69
Discharge: HOME | End: 2024-02-22
Payer: MEDICARE

## 2024-02-20 ENCOUNTER — OFFICE VISIT (OUTPATIENT)
Dept: FAMILY MEDICINE CLINIC | Age: 69
End: 2024-02-20
Payer: MEDICARE

## 2024-02-20 VITALS
DIASTOLIC BLOOD PRESSURE: 76 MMHG | OXYGEN SATURATION: 98 % | HEART RATE: 65 BPM | BODY MASS INDEX: 34.58 KG/M2 | SYSTOLIC BLOOD PRESSURE: 134 MMHG | WEIGHT: 255 LBS

## 2024-02-20 DIAGNOSIS — K42.9 UMBILICAL HERNIA WITHOUT OBSTRUCTION AND WITHOUT GANGRENE: ICD-10-CM

## 2024-02-20 DIAGNOSIS — E78.2 MIXED HYPERLIPIDEMIA: Primary | ICD-10-CM

## 2024-02-20 PROCEDURE — G8427 DOCREV CUR MEDS BY ELIG CLIN: HCPCS

## 2024-02-20 PROCEDURE — 99213 OFFICE O/P EST LOW 20 MIN: CPT

## 2024-02-20 PROCEDURE — 76705 ECHO EXAM OF ABDOMEN: CPT

## 2024-02-20 PROCEDURE — G8484 FLU IMMUNIZE NO ADMIN: HCPCS

## 2024-02-20 PROCEDURE — 3017F COLORECTAL CA SCREEN DOC REV: CPT

## 2024-02-20 PROCEDURE — 3078F DIAST BP <80 MM HG: CPT

## 2024-02-20 PROCEDURE — 1036F TOBACCO NON-USER: CPT

## 2024-02-20 PROCEDURE — G8417 CALC BMI ABV UP PARAM F/U: HCPCS

## 2024-02-20 PROCEDURE — 3075F SYST BP GE 130 - 139MM HG: CPT

## 2024-02-20 PROCEDURE — 99212 OFFICE O/P EST SF 10 MIN: CPT

## 2024-02-20 PROCEDURE — 1123F ACP DISCUSS/DSCN MKR DOCD: CPT

## 2024-02-20 ASSESSMENT — ENCOUNTER SYMPTOMS
COUGH: 0
ABDOMINAL DISTENTION: 1
NAUSEA: 1
VOMITING: 0
BLOOD IN STOOL: 0
ABDOMINAL PAIN: 1
CONSTIPATION: 1
SHORTNESS OF BREATH: 0
DIARRHEA: 0

## 2024-02-20 ASSESSMENT — PATIENT HEALTH QUESTIONNAIRE - PHQ9
2. FEELING DOWN, DEPRESSED OR HOPELESS: NOT AT ALL
SUM OF ALL RESPONSES TO PHQ QUESTIONS 1-9: 0
SUM OF ALL RESPONSES TO PHQ QUESTIONS 1-9: 0
2. FEELING DOWN, DEPRESSED OR HOPELESS: 0
SUM OF ALL RESPONSES TO PHQ9 QUESTIONS 1 & 2: 0
SUM OF ALL RESPONSES TO PHQ QUESTIONS 1-9: 0
SUM OF ALL RESPONSES TO PHQ QUESTIONS 1-9: 0
1. LITTLE INTEREST OR PLEASURE IN DOING THINGS: NOT AT ALL
1. LITTLE INTEREST OR PLEASURE IN DOING THINGS: 0
SUM OF ALL RESPONSES TO PHQ9 QUESTIONS 1 & 2: 0

## 2024-02-20 NOTE — PROGRESS NOTES
[facility]  [address] [id number]  Dept: [phone]    Date of Service:  2/20/2024    Marcus Mustafa is a 68 y.o. male who presents in office today with Self.    Chief Complaint   Patient presents with    Abdominal Pain     Patient is here today for ongoing abdominal pain. He does have irregular bowel movements and wonders if his hernia is causing the pain.         Diagnoses / Plan:   1. Mixed hyperlipidemia  2. Umbilical hernia without obstruction and without gangrene  -     US ABDOMEN LIMITED; Future  -     [facility] General [facility], [place]   -Noted hernia along umbilicus.  States this has been here for a while but no imaging.  Worse with eating, generalized tenderness has been ongoing waxes and wanes in severity.  Still having bowel movements however they have changed in shape.  Get ultrasound, depending on ultrasound results will need to follow-up with general surgery for further treatment options.  Red flag symptoms such as abdominal rigidity, fever, chills, blood in stool, worsening abdominal pain is discussed he will go to the emergency department right away if he experiences that start on MiraLAX 1 capful daily, clear liquid diet at the meantime until ultrasound results are back.      Encouraged symptomatic treatment, rest, increase oral fluid intake.  Follow-up for worsening or persistent symptoms.  Patient verbalizes understanding regarding plan of care and all questions answered.    Return if symptoms worsen or fail to improve.     Subjective:   History of Present Illness:  Complains of worsening abdominal pain.  This has been ongoing for several years off-and-on waxing waning in severity.  Worse in the last week or so.  Food does make this pain worse, nothing really makes it better.  No fever, chills.  Noted to have abdominal hernia.  States the shape of his bowel movements recently has changed and is narrowing.  Has not had imaging on this.  Is

## 2024-02-21 DIAGNOSIS — K57.30 DIVERTICULOSIS OF LARGE INTESTINE WITHOUT HEMORRHAGE: ICD-10-CM

## 2024-02-21 DIAGNOSIS — R10.84 GENERALIZED ABDOMINAL PAIN: Primary | ICD-10-CM

## 2024-02-21 DIAGNOSIS — K42.9 UMBILICAL HERNIA WITHOUT OBSTRUCTION AND WITHOUT GANGRENE: ICD-10-CM

## 2024-02-22 LAB
ANION GAP SERPL CALCULATED.3IONS-SCNC: 2.8 MMOL/L (ref 3–11)
BUN BLDV-MCNC: 19 MG/DL (ref 9–20)
CALCIUM SERPL-MCNC: 9.4 MG/DL (ref 8.4–10.2)
CHLORIDE BLD-SCNC: 106 MMOL/L (ref 98–120)
CO2: 31 MMOL/L (ref 22–31)
CREAT SERPL-MCNC: 1.1 MG/DL (ref 0.7–1.3)
GFR CALCULATED: > 60
GLUCOSE: 114 MG/DL (ref 75–110)
POTASSIUM SERPL-SCNC: 4.7 MMOL/L (ref 3.6–5)
SODIUM BLD-SCNC: 141 MMOL/L (ref 135–145)

## 2024-03-07 ENCOUNTER — TELEPHONE (OUTPATIENT)
Dept: SURGERY | Age: 69
End: 2024-03-07

## 2024-03-07 ENCOUNTER — INITIAL CONSULT (OUTPATIENT)
Dept: SURGERY | Age: 69
End: 2024-03-07
Payer: MEDICARE

## 2024-03-07 VITALS
BODY MASS INDEX: 35.16 KG/M2 | DIASTOLIC BLOOD PRESSURE: 84 MMHG | HEART RATE: 68 BPM | WEIGHT: 259.6 LBS | OXYGEN SATURATION: 95 % | HEIGHT: 72 IN | SYSTOLIC BLOOD PRESSURE: 136 MMHG

## 2024-03-07 DIAGNOSIS — K42.9 UMBILICAL HERNIA WITHOUT OBSTRUCTION AND WITHOUT GANGRENE: Primary | ICD-10-CM

## 2024-03-07 DIAGNOSIS — E66.01 SEVERE OBESITY (BMI 35.0-39.9) WITH COMORBIDITY (HCC): ICD-10-CM

## 2024-03-07 PROCEDURE — 99214 OFFICE O/P EST MOD 30 MIN: CPT | Performed by: SURGERY

## 2024-03-07 PROCEDURE — G8427 DOCREV CUR MEDS BY ELIG CLIN: HCPCS | Performed by: SURGERY

## 2024-03-07 PROCEDURE — 1123F ACP DISCUSS/DSCN MKR DOCD: CPT | Performed by: SURGERY

## 2024-03-07 PROCEDURE — 1036F TOBACCO NON-USER: CPT | Performed by: SURGERY

## 2024-03-07 PROCEDURE — 3017F COLORECTAL CA SCREEN DOC REV: CPT | Performed by: SURGERY

## 2024-03-07 PROCEDURE — G8484 FLU IMMUNIZE NO ADMIN: HCPCS | Performed by: SURGERY

## 2024-03-07 PROCEDURE — 3075F SYST BP GE 130 - 139MM HG: CPT | Performed by: SURGERY

## 2024-03-07 PROCEDURE — 99215 OFFICE O/P EST HI 40 MIN: CPT

## 2024-03-07 PROCEDURE — 3079F DIAST BP 80-89 MM HG: CPT | Performed by: SURGERY

## 2024-03-07 PROCEDURE — G8417 CALC BMI ABV UP PARAM F/U: HCPCS | Performed by: SURGERY

## 2024-03-07 RX ORDER — POLYETHYLENE GLYCOL 3350 17 G/17G
17 POWDER, FOR SOLUTION ORAL DAILY
COMMUNITY

## 2024-03-07 NOTE — TELEPHONE ENCOUNTER
[facility]     Pre-Operative Evaluation/Consultation    Name:  Marcus Mustafa                                         Age:  68 y.o.  MRN:  [id number]       :  1955   Date:  3/7/2024         Sex: male    There were no encounter diagnoses.    Surgeon:  Dr. Navarro  Procedure (Planned):  Open Umbilical Hernia Repair with Primary Possible mesh  Date Scheduled surgery: May 6 th 2024    Attending : No att. providers found    Primary Physician: Ovi HERRERA-CNP  Cardiologist: None    Type of Anesthesia Requested: General    Patient Medical history:  Allergies   Allergen Reactions    Gabapentin Other (See Comments)     Dizziness reported    Augmentin [Amoxicillin-Pot Clavulanate] Rash     Social History     Tobacco Use    Smoking status: Never    Smokeless tobacco: Never   Substance Use Topics    Alcohol use: Yes     Comment: very occasionally    Drug use: Never         Additional ordered pre-operative testing:  []CBC    []ABG      [] BMP   []URINALYSIS   []CMP    []HCG   []COAGS PT/INR  []T&C  []LFTs   []TYPE AND SCREEN    [] EKG  [] Chest X-Ray  [] Other Radiology    [] Sent to Hospitalist None  [x] Sent to Anesthesia for your review:    [] Additional Orders: None     Comments:None   Requests: No Special requests    Signed: Samantha Valdez LPN 3/7/2024 4:39 PM

## 2024-03-07 NOTE — PROGRESS NOTES
Allergies  Gabapentin and Augmentin [amoxicillin-pot clavulanate]      Review of Systems:  General: Denies any fever, chills.  HEENT: Denies any diplopia, tinnitus or vertigo.  Respiratory: Denies any shortness of breath or cough.  Cardiac: Denies any chest pain, palpitations, claudication or edema.  Gastrointestinal: Denies any melena, hematochezia, hematemesis or pyrosis.  Genitourinary: Denies any frequency, urgency, hesitancy or incontinence.  Hematologic: Denies bruising or bleeding easily.  Endocrine: Denies any history of diabetes or thyroid disease.    PHYSICAL EXAMINATION  Vitals:   Vitals:    03/07/24 1517   BP: 136/84   Pulse: 68   SpO2: 95%     General Appearance:  awake, alert, oriented, in no acute distress, well developed, well nourished and in no acute distress  Skin:  Skin color, texture, turgor normal. No rashes or lesions.  Head/face:  NCAT  Eyes:  No gross abnormalities., PERRL, Pupils- PERRL.  Ears:  canals and TMs NI and External- normal  Nose/Sinuses:  Nares normal. Septum midline. Mucosa normal. No drainage or sinus tenderness.  Mouth/Throat:  Mucosa moist.  No lesions.  Pharynx without erythema, edema or exudate.  Lungs:  Normal expansion.  Clear to auscultation.  No rales, rhonchi, or wheezing., Breathing Pattern: regular, no distress, Breath sounds: normal  Heart:  Heart sounds are normal.  Regular rate and rhythm without murmur, gallop or rub.  Auscultation: Normal S1 and S2.  Regular rhythm. No murmurs, gallops, or rubs.   Abdomen:  Soft, non-tender, normal bowel sounds.  No bruits, organomegaly or masses.  Musculoskeletal:  negative, negative findings: no erythema, induration, or nodules, ROM of all joints is normal, strength normal  Neurologic:  negative findings: proximal muscle strength normal, speech normal, mental status intact, cranial nerves 2-12 intact, muscle tone normal, muscle strength normal    LABS:  CBC   Lab Results   Component Value Date/Time    WBC 5.0 12/20/2023

## 2024-03-08 ENCOUNTER — TELEPHONE (OUTPATIENT)
Dept: SURGERY | Age: 69
End: 2024-03-08

## 2024-03-08 NOTE — TELEPHONE ENCOUNTER
Returned call to patient and rescheduled hernia surgery to Monday May 6th 2024 per patient request. [facility] aware of reschedule.     Surgery Date: May 6th 2024    Location: [place]

## 2024-03-08 NOTE — TELEPHONE ENCOUNTER
[facility]  General Surgery - Dr. Navarro   Phone # [phone]  Fax # [phone]    Patient:Marcus Mustafa    :1955     Surgical/Procedure Planned: Open Umbilical Hernia Repair with possible mesh    Date & Location: 24       Outpatient   Planned Length of OR: 1-2 hr.    Sedation: General    Estimated Cardiac Risk for Non-Cardiac Surgery/Procedure     Low______ Moderate______ High______    Medication Instructions - Clarification needed by this date:     ASA 81 mg           Hold ___ Days    Resume medications:    Signature of Provider Giving Orders for Medication holds:    _____________________________________________

## 2024-04-08 DIAGNOSIS — I10 ESSENTIAL HYPERTENSION: ICD-10-CM

## 2024-04-08 NOTE — TELEPHONE ENCOUNTER
Marcus Mustafa is requesting a refill on the following medication(s):  Requested Prescriptions     Pending Prescriptions Disp Refills    nebivolol (BYSTOLIC) 2.5 MG tablet [Pharmacy Med Name: Nebivolol HCl Oral Tablet 2.5 MG] 90 tablet 0     Sig: TAKE 1 TABLET BY MOUTH EVERY DAY       Last Visit Date (If Applicable):  2/20/2024    Next Visit Date:    6/24/2024

## 2024-04-08 NOTE — TELEPHONE ENCOUNTER
Requested Prescriptions     Pending Prescriptions Disp Refills    nebivolol (BYSTOLIC) 2.5 MG tablet [Pharmacy Med Name: Nebivolol HCl Oral Tablet 2.5 MG] 90 tablet 0     Sig: TAKE 1 TABLET BY MOUTH EVERY DAY      Pt called stating that he will be leaving 4/12 for 2 weeks, and he will need this refilled prior. Please advise.

## 2024-04-09 RX ORDER — NEBIVOLOL 2.5 MG/1
2.5 TABLET ORAL DAILY
Qty: 90 TABLET | Refills: 0 | Status: SHIPPED | OUTPATIENT
Start: 2024-04-09

## 2024-04-16 NOTE — TELEPHONE ENCOUNTER
Received completed aspirin 81 mg hold from PCP.     Patient contacted and informed to hold ASA x 7 days prior to umbilical hernia repair on 5/6/24 with Dr. Navarro.

## 2024-05-03 ENCOUNTER — ANESTHESIA EVENT (OUTPATIENT)
Dept: OPERATING ROOM | Age: 69
End: 2024-05-03
Payer: MEDICARE

## 2024-05-06 ENCOUNTER — HOSPITAL ENCOUNTER (OUTPATIENT)
Age: 69
Setting detail: OUTPATIENT SURGERY
Discharge: HOME | End: 2024-05-06
Attending: SURGERY | Admitting: SURGERY
Payer: MEDICARE

## 2024-05-06 ENCOUNTER — ANESTHESIA (OUTPATIENT)
Dept: OPERATING ROOM | Age: 69
End: 2024-05-06
Payer: MEDICARE

## 2024-05-06 VITALS
WEIGHT: 257.4 LBS | SYSTOLIC BLOOD PRESSURE: 142 MMHG | RESPIRATION RATE: 16 BRPM | BODY MASS INDEX: 34.86 KG/M2 | HEIGHT: 72 IN | DIASTOLIC BLOOD PRESSURE: 87 MMHG | TEMPERATURE: 98.1 F | HEART RATE: 60 BPM | OXYGEN SATURATION: 95 %

## 2024-05-06 DIAGNOSIS — K42.9 UMBILICAL HERNIA WITHOUT OBSTRUCTION AND WITHOUT GANGRENE: ICD-10-CM

## 2024-05-06 DIAGNOSIS — G89.18 POST-OPERATIVE PAIN: Primary | ICD-10-CM

## 2024-05-06 PROCEDURE — 7100000011 HC PHASE II RECOVERY - ADDTL 15 MIN: Performed by: SURGERY

## 2024-05-06 PROCEDURE — 6360000002 HC RX W HCPCS: Performed by: SURGERY

## 2024-05-06 PROCEDURE — 6360000002 HC RX W HCPCS: Performed by: NURSE ANESTHETIST, CERTIFIED REGISTERED

## 2024-05-06 PROCEDURE — 2500000003 HC RX 250 WO HCPCS: Performed by: SURGERY

## 2024-05-06 PROCEDURE — 3600000012 HC SURGERY LEVEL 2 ADDTL 15MIN: Performed by: SURGERY

## 2024-05-06 PROCEDURE — 2580000003 HC RX 258: Performed by: SURGERY

## 2024-05-06 PROCEDURE — 3700000000 HC ANESTHESIA ATTENDED CARE: Performed by: SURGERY

## 2024-05-06 PROCEDURE — 3700000001 HC ADD 15 MINUTES (ANESTHESIA): Performed by: SURGERY

## 2024-05-06 PROCEDURE — 2709999900 HC NON-CHARGEABLE SUPPLY: Performed by: SURGERY

## 2024-05-06 PROCEDURE — 3600000002 HC SURGERY LEVEL 2 BASE: Performed by: SURGERY

## 2024-05-06 PROCEDURE — 2500000003 HC RX 250 WO HCPCS: Performed by: NURSE ANESTHETIST, CERTIFIED REGISTERED

## 2024-05-06 PROCEDURE — C1781 MESH (IMPLANTABLE): HCPCS | Performed by: SURGERY

## 2024-05-06 PROCEDURE — 7100000010 HC PHASE II RECOVERY - FIRST 15 MIN: Performed by: SURGERY

## 2024-05-06 PROCEDURE — 88302 TISSUE EXAM BY PATHOLOGIST: CPT

## 2024-05-06 DEVICE — PATCH HERN M DIA2.5IN CIR W/ STRP SEPRA TECHNOLOGY ABSRB: Type: IMPLANTABLE DEVICE | Site: UMBILICAL | Status: FUNCTIONAL

## 2024-05-06 RX ORDER — CIPROFLOXACIN 2 MG/ML
400 INJECTION, SOLUTION INTRAVENOUS ONCE
Status: DISCONTINUED | OUTPATIENT
Start: 2024-05-06 | End: 2024-05-06 | Stop reason: HOSPADM

## 2024-05-06 RX ORDER — SODIUM CHLORIDE 9 MG/ML
INJECTION, SOLUTION INTRAVENOUS PRN
Status: DISCONTINUED | OUTPATIENT
Start: 2024-05-06 | End: 2024-05-06 | Stop reason: HOSPADM

## 2024-05-06 RX ORDER — ONDANSETRON 2 MG/ML
4 INJECTION INTRAMUSCULAR; INTRAVENOUS
Status: CANCELLED | OUTPATIENT
Start: 2024-05-06 | End: 2024-05-07

## 2024-05-06 RX ORDER — NALOXONE HYDROCHLORIDE 0.4 MG/ML
INJECTION, SOLUTION INTRAMUSCULAR; INTRAVENOUS; SUBCUTANEOUS PRN
Status: CANCELLED | OUTPATIENT
Start: 2024-05-06

## 2024-05-06 RX ORDER — OXYCODONE HYDROCHLORIDE 5 MG/1
5 TABLET ORAL
Status: CANCELLED | OUTPATIENT
Start: 2024-05-06 | End: 2024-05-07

## 2024-05-06 RX ORDER — SODIUM CHLORIDE 0.9 % (FLUSH) 0.9 %
5-40 SYRINGE (ML) INJECTION EVERY 12 HOURS SCHEDULED
Status: CANCELLED | OUTPATIENT
Start: 2024-05-06

## 2024-05-06 RX ORDER — ONDANSETRON 2 MG/ML
INJECTION INTRAMUSCULAR; INTRAVENOUS PRN
Status: DISCONTINUED | OUTPATIENT
Start: 2024-05-06 | End: 2024-05-06 | Stop reason: SDUPTHER

## 2024-05-06 RX ORDER — SODIUM CHLORIDE 0.9 % (FLUSH) 0.9 %
5-40 SYRINGE (ML) INJECTION PRN
Status: CANCELLED | OUTPATIENT
Start: 2024-05-06

## 2024-05-06 RX ORDER — FENTANYL CITRATE 50 UG/ML
25 INJECTION, SOLUTION INTRAMUSCULAR; INTRAVENOUS EVERY 5 MIN PRN
Status: CANCELLED | OUTPATIENT
Start: 2024-05-06

## 2024-05-06 RX ORDER — DEXAMETHASONE SODIUM PHOSPHATE 4 MG/ML
INJECTION, SOLUTION INTRA-ARTICULAR; INTRALESIONAL; INTRAMUSCULAR; INTRAVENOUS; SOFT TISSUE PRN
Status: DISCONTINUED | OUTPATIENT
Start: 2024-05-06 | End: 2024-05-06 | Stop reason: SDUPTHER

## 2024-05-06 RX ORDER — PROPOFOL 10 MG/ML
INJECTION, EMULSION INTRAVENOUS PRN
Status: DISCONTINUED | OUTPATIENT
Start: 2024-05-06 | End: 2024-05-06 | Stop reason: SDUPTHER

## 2024-05-06 RX ORDER — ROCURONIUM BROMIDE 10 MG/ML
INJECTION, SOLUTION INTRAVENOUS PRN
Status: DISCONTINUED | OUTPATIENT
Start: 2024-05-06 | End: 2024-05-06 | Stop reason: SDUPTHER

## 2024-05-06 RX ORDER — LIDOCAINE HYDROCHLORIDE 10 MG/ML
INJECTION, SOLUTION EPIDURAL; INFILTRATION; INTRACAUDAL; PERINEURAL PRN
Status: DISCONTINUED | OUTPATIENT
Start: 2024-05-06 | End: 2024-05-06 | Stop reason: SDUPTHER

## 2024-05-06 RX ORDER — SODIUM CHLORIDE, SODIUM LACTATE, POTASSIUM CHLORIDE, CALCIUM CHLORIDE 600; 310; 30; 20 MG/100ML; MG/100ML; MG/100ML; MG/100ML
INJECTION, SOLUTION INTRAVENOUS CONTINUOUS
Status: DISCONTINUED | OUTPATIENT
Start: 2024-05-06 | End: 2024-05-06 | Stop reason: HOSPADM

## 2024-05-06 RX ORDER — SODIUM CHLORIDE 0.9 % (FLUSH) 0.9 %
5-40 SYRINGE (ML) INJECTION EVERY 12 HOURS SCHEDULED
Status: DISCONTINUED | OUTPATIENT
Start: 2024-05-06 | End: 2024-05-06 | Stop reason: HOSPADM

## 2024-05-06 RX ORDER — HYDROCODONE BITARTRATE AND ACETAMINOPHEN 5; 325 MG/1; MG/1
1 TABLET ORAL EVERY 6 HOURS PRN
Qty: 20 TABLET | Refills: 0 | Status: SHIPPED | OUTPATIENT
Start: 2024-05-06 | End: 2024-05-11

## 2024-05-06 RX ORDER — SODIUM CHLORIDE 9 MG/ML
INJECTION, SOLUTION INTRAVENOUS PRN
Status: CANCELLED | OUTPATIENT
Start: 2024-05-06

## 2024-05-06 RX ORDER — FENTANYL CITRATE 50 UG/ML
INJECTION, SOLUTION INTRAMUSCULAR; INTRAVENOUS PRN
Status: DISCONTINUED | OUTPATIENT
Start: 2024-05-06 | End: 2024-05-06 | Stop reason: SDUPTHER

## 2024-05-06 RX ORDER — SODIUM CHLORIDE 0.9 % (FLUSH) 0.9 %
5-40 SYRINGE (ML) INJECTION PRN
Status: DISCONTINUED | OUTPATIENT
Start: 2024-05-06 | End: 2024-05-06 | Stop reason: HOSPADM

## 2024-05-06 RX ADMIN — LIDOCAINE HYDROCHLORIDE 50 MG: 10 INJECTION, SOLUTION EPIDURAL; INFILTRATION; INTRACAUDAL; PERINEURAL at 08:24

## 2024-05-06 RX ADMIN — ONDANSETRON 4 MG: 2 INJECTION INTRAMUSCULAR; INTRAVENOUS at 08:38

## 2024-05-06 RX ADMIN — PROPOFOL 200 MG: 10 INJECTION, EMULSION INTRAVENOUS at 08:24

## 2024-05-06 RX ADMIN — SODIUM CHLORIDE, POTASSIUM CHLORIDE, SODIUM LACTATE AND CALCIUM CHLORIDE: 600; 310; 30; 20 INJECTION, SOLUTION INTRAVENOUS at 07:54

## 2024-05-06 RX ADMIN — SODIUM CHLORIDE, POTASSIUM CHLORIDE, SODIUM LACTATE AND CALCIUM CHLORIDE: 600; 310; 30; 20 INJECTION, SOLUTION INTRAVENOUS at 08:38

## 2024-05-06 RX ADMIN — FENTANYL CITRATE 50 MCG: 50 INJECTION, SOLUTION INTRAMUSCULAR; INTRAVENOUS at 08:24

## 2024-05-06 RX ADMIN — DEXAMETHASONE SODIUM PHOSPHATE 4 MG: 4 INJECTION INTRA-ARTICULAR; INTRALESIONAL; INTRAMUSCULAR; INTRAVENOUS; SOFT TISSUE at 08:38

## 2024-05-06 RX ADMIN — FENTANYL CITRATE 50 MCG: 50 INJECTION, SOLUTION INTRAMUSCULAR; INTRAVENOUS at 08:50

## 2024-05-06 RX ADMIN — SUGAMMADEX 200 MG: 100 INJECTION, SOLUTION INTRAVENOUS at 09:13

## 2024-05-06 RX ADMIN — ROCURONIUM BROMIDE 20 MG: 10 INJECTION, SOLUTION INTRAVENOUS at 08:37

## 2024-05-06 ASSESSMENT — PAIN - FUNCTIONAL ASSESSMENT: PAIN_FUNCTIONAL_ASSESSMENT: 0-10

## 2024-05-06 ASSESSMENT — PAIN SCALES - GENERAL
PAINLEVEL_OUTOF10: 0

## 2024-05-06 NOTE — DISCHARGE INSTRUCTIONS
Take norco for pain as needed  No driving for 24 hours or while taking pain medication  No lifting more than 10 pounds for 6 weeks  May shower starting after 24 hours  Normal diet  May remove dressings tomorrow but leave steristrips in place until they fall off  Follow up in Two weeks with Jimbo Negron PA-C

## 2024-05-06 NOTE — ANESTHESIA PRE PROCEDURE
400 mg IntraVENous Once Maurice Navarro MD           Allergies:    Allergies   Allergen Reactions   • Gabapentin Other (See Comments)     Dizziness reported   • Augmentin [Amoxicillin-Pot Clavulanate] Rash       Problem List:    Patient Active Problem List   Diagnosis Code   • Essential (primary) hypertension I10   • Diverticulosis of large intestine without hemorrhage K57.30   • Schatzki's ring of distal esophagus K22.2   • Pain of left hip joint M25.552   • Internal hemorrhoids K64.8   • Hyperlipidemia E78.5   • Gastroesophageal reflux disease without esophagitis K21.9   • Peripheral vascular disease (HCC) I73.9   • Sciatica of left side M54.32   • BMI 31.0-31.9,adult Z68.31   • Greater trochanteric bursitis of right hip M70.61   • Hammer toe of right foot M20.41       Past Medical History:        Diagnosis Date   • GERD (gastroesophageal reflux disease)    • Hypertension        Past Surgical History:        Procedure Laterality Date   • COLONOSCOPY N/A     3/07; 1/9/17   • SHOULDER SURGERY Right    • UPPER GASTROINTESTINAL ENDOSCOPY  02/10/2017    stricture and dilation       Social History:    Social History     Tobacco Use   • Smoking status: Never   • Smokeless tobacco: Never   Substance Use Topics   • Alcohol use: Yes     Comment: very occasionally                                Counseling given: Not Answered      Vital Signs (Current): There were no vitals filed for this visit.                                           BP Readings from Last 3 Encounters:   03/07/24 136/84   02/20/24 134/76   12/21/23 124/82       NPO Status:                                                                                 BMI:   Wt Readings from Last 3 Encounters:   03/07/24 117.8 kg (259 lb 9.6 oz)   02/20/24 115.7 kg (255 lb)   12/21/23 113.4 kg (250 lb)     There is no height or weight on file to calculate BMI.    CBC:   Lab Results   Component Value Date/Time    WBC 5.0 12/20/2023 09:53 AM    RBC 5.58 12/20/2023 09:53 AM

## 2024-05-06 NOTE — OP NOTE
Operative Note      Patient: Marcus Mustafa  YOB: 1955  MRN: [id number]    Date of Procedure: 5/6/2024    Pre-Op Diagnosis Codes:     * Umbilical hernia without obstruction and without gangrene [K42.9]    Post-Op Diagnosis: Same, defect was 1.5 to 2cm in diameter       Procedure(s):  Open Umbilical hernia repair with primary closure and mesh    Surgeon(s):  Maurice Navarro MD    Assistant:   * No surgical staff found *    Anesthesia: General    Estimated Blood Loss (mL): Minimal    Complications: None    Specimens:   ID Type Source Tests Collected by Time Destination   A : Hernia sac Tissue Abdomen SURGICAL PATHOLOGY Maurice Navarro MD 5/6/2024 0854        Implants:  Implant Name Type Inv. Item Serial No.  Lot No. LRB No. Used Action   PATCH LUIS M DIA2.5IN CIR W/ STRP [employer] ABSRB - [id number]  PATCH LUIS M DIA2.5IN CIR W/ STRP [employer] ABSRB  BARD DAVOL-WD [id number] N/A 1 Implanted         Drains: * No LDAs found *    Findings:  Infection Present At Time Of Surgery (PATOS) (choose all levels that have infection present):  No infection present      Detailed Description of Procedure:   Patient was taken to the OR placed in supine position the abdomen was prepped and draped in usual sterile fashion.  We made a curvilinear incision around the superior aspect of the umbilicus.  We took down the subcutaneous tissue we identified the hernia sac.  We cleaned off the hernia sac down to the defect and remove the umbel umbilicus from the hernia sac.  We then cleaned off the defect and sent the hernia sac to pathology.  The defect was about 2 cm in diameter.  We cleaned off the edges.  We were able to get into the peritoneal cavity and do a finger swipe and there was no adhesions up to the fascia.  We used a medium Ventralex mesh it position nicely and we sutured in with 2-0 Vicryl suture.  We then closed the defect over the mesh with interrupted horizontal mattress sutures of 0 Nurolon

## 2024-05-06 NOTE — ANESTHESIA POSTPROCEDURE EVALUATION
Department of Anesthesiology  Postprocedure Note    Patient: Marcus Mustafa  MRN: [id number]  YOB: 1955  Date of evaluation: 5/6/2024    Procedure Summary       Date: 05/06/24 Room / Location: [address]    Anesthesia Start: 0822 Anesthesia Stop: 0934    Procedure: Open Umbilical hernia repair with primary closure and mesh (Abdomen) Diagnosis:       Umbilical hernia without obstruction and without gangrene      (Umbilical hernia without obstruction and without gangrene [K42.9])    Surgeons: Maurice Navarro MD Responsible Provider: Adonis Denton APRN - CRNA    Anesthesia Type: General ASA Status: 3            Anesthesia Type: General    Zaheer Phase I: Zaheer Score: 10    Zaheer Phase II: Zaheer Score: 9    Anesthesia Post Evaluation    Patient location during evaluation: bedside  Level of consciousness: sleepy but conscious  Airway patency: patent  Nausea & Vomiting: no nausea and no vomiting  Cardiovascular status: hemodynamically stable  Respiratory status: spontaneous ventilation  Hydration status: stable  Pain management: satisfactory to patient    No notable events documented.

## 2024-05-06 NOTE — H&P
Date/Time    AST 51 12/20/2023 09:53 AM    ALT 33 12/20/2023 09:53 AM    ALKPHOS 47 12/20/2023 09:53 AM    BILITOT 0.7 12/20/2023 09:53 AM     COAGS: No results found for: \"INR\"  Lipids:   Lab Results   Component Value Date/Time    CHOL 196 12/20/2023 09:53 AM    CHOL 197 12/31/2019 12:00 AM    HDL 45 12/20/2023 09:53 AM    CHOLHDLRATIO 4.00 12/20/2023 09:53 AM    TRIG 98 12/20/2023 09:53 AM    VLDL 20 12/20/2023 09:53 AM       RADIOLOGY:  All images reviewed and within normal limits unless otherwise specified: Yes    IMPRESSIONS:  Umbilical hernia, reducible, 1.2 cm defect  Surgical Risk: low to moderate risk    PLAN:  Open UH repair with primary and/or mesh    Medical Decision Making: moderate complexity    Thank you for the interesting evaluation. Further recommendations to follow.    VIRGEN AMES MD  Electronically signed 3/8/2024 at 10:19 PM

## 2024-05-08 LAB — SURGICAL PATHOLOGY REPORT: NORMAL

## 2024-05-20 ENCOUNTER — OFFICE VISIT (OUTPATIENT)
Dept: SURGERY | Age: 69
End: 2024-05-20
Payer: MEDICARE

## 2024-05-20 VITALS
RESPIRATION RATE: 16 BRPM | HEART RATE: 64 BPM | TEMPERATURE: 97.8 F | HEIGHT: 72 IN | BODY MASS INDEX: 34.89 KG/M2 | SYSTOLIC BLOOD PRESSURE: 132 MMHG | DIASTOLIC BLOOD PRESSURE: 82 MMHG | OXYGEN SATURATION: 95 % | WEIGHT: 257.6 LBS

## 2024-05-20 DIAGNOSIS — Z48.89 POSTOPERATIVE VISIT: Primary | ICD-10-CM

## 2024-05-20 PROCEDURE — 99212 OFFICE O/P EST SF 10 MIN: CPT | Performed by: PHYSICIAN ASSISTANT

## 2024-05-20 PROCEDURE — 99024 POSTOP FOLLOW-UP VISIT: CPT | Performed by: PHYSICIAN ASSISTANT

## 2024-05-20 NOTE — PROGRESS NOTES
Chest pain, denies palpitations  Respiratory: Denies shortness of breath, denies cough  Per hpi      Objective   Vitals:    05/20/24 0940   BP: 132/82   Pulse: 64   Resp: 16   Temp: 97.8 °F (36.6 °C)   SpO2: 95%     General:NC/AT, cooperative, not lethargic  Lungs: Breathing without increased work, comfortable, no active coughing, symmetric chest rise, speaking in complete sentences  Heart:  RRR, radial pulses 2+  Abdomen: Soft, nondistended, bowel sounds audible throughout, no tenderness of any area of the abdomen, incision site is healing well with no evidence of dehiscence, no surrounding erythema/swelling/tenderness, no active drainage.  No recurrence of hernia is palpable.  Extremity: Muscle tone appears appropriate,   Neuro: No focal deficits appreciated, moving all extremities      ASSESSMENT   Marcus Mustafa  is a 69 y.o. male who presents today for post operative follow up after having Open Umbilical hernia repair with primary closure and mesh  done on 5/6/2024 by Dr. Navarro. Currently is doing well.  No major concerns here today, incision site look good, no complications or infection present.    PLAN  Use acetaminophen for pain as needed per otc instructions  No lifting more than 10 pounds for 6 weeks from day of surgery  Follow up as needed, please call if you have any questions or concerns.      (Please note that portions of this note were completed with a voice recognition program.  Efforts were made to edit the dictations but occasionally words are mis-transcribed.)

## 2024-06-30 SDOH — ECONOMIC STABILITY: FOOD INSECURITY: WITHIN THE PAST 12 MONTHS, THE FOOD YOU BOUGHT JUST DIDN'T LAST AND YOU DIDN'T HAVE MONEY TO GET MORE.: NEVER TRUE

## 2024-06-30 SDOH — ECONOMIC STABILITY: FOOD INSECURITY: WITHIN THE PAST 12 MONTHS, YOU WORRIED THAT YOUR FOOD WOULD RUN OUT BEFORE YOU GOT MONEY TO BUY MORE.: NEVER TRUE

## 2024-06-30 SDOH — ECONOMIC STABILITY: INCOME INSECURITY: HOW HARD IS IT FOR YOU TO PAY FOR THE VERY BASICS LIKE FOOD, HOUSING, MEDICAL CARE, AND HEATING?: NOT HARD AT ALL

## 2024-06-30 SDOH — ECONOMIC STABILITY: TRANSPORTATION INSECURITY
IN THE PAST 12 MONTHS, HAS LACK OF TRANSPORTATION KEPT YOU FROM MEETINGS, WORK, OR FROM GETTING THINGS NEEDED FOR DAILY LIVING?: NO

## 2024-07-01 ENCOUNTER — OFFICE VISIT (OUTPATIENT)
Dept: FAMILY MEDICINE CLINIC | Age: 69
End: 2024-07-01
Payer: MEDICARE

## 2024-07-01 VITALS
DIASTOLIC BLOOD PRESSURE: 82 MMHG | WEIGHT: 243 LBS | SYSTOLIC BLOOD PRESSURE: 122 MMHG | HEIGHT: 72 IN | OXYGEN SATURATION: 96 % | HEART RATE: 56 BPM | BODY MASS INDEX: 32.91 KG/M2

## 2024-07-01 DIAGNOSIS — I73.9 PERIPHERAL VASCULAR DISEASE (HCC): ICD-10-CM

## 2024-07-01 DIAGNOSIS — Z12.5 PROSTATE CANCER SCREENING: ICD-10-CM

## 2024-07-01 DIAGNOSIS — E78.2 MIXED HYPERLIPIDEMIA: Primary | ICD-10-CM

## 2024-07-01 DIAGNOSIS — I10 ESSENTIAL (PRIMARY) HYPERTENSION: ICD-10-CM

## 2024-07-01 PROCEDURE — G8417 CALC BMI ABV UP PARAM F/U: HCPCS

## 2024-07-01 PROCEDURE — 99212 OFFICE O/P EST SF 10 MIN: CPT

## 2024-07-01 PROCEDURE — 3074F SYST BP LT 130 MM HG: CPT

## 2024-07-01 PROCEDURE — 1036F TOBACCO NON-USER: CPT

## 2024-07-01 PROCEDURE — 3017F COLORECTAL CA SCREEN DOC REV: CPT

## 2024-07-01 PROCEDURE — G8427 DOCREV CUR MEDS BY ELIG CLIN: HCPCS

## 2024-07-01 PROCEDURE — 1123F ACP DISCUSS/DSCN MKR DOCD: CPT

## 2024-07-01 PROCEDURE — 3079F DIAST BP 80-89 MM HG: CPT

## 2024-07-01 PROCEDURE — 99214 OFFICE O/P EST MOD 30 MIN: CPT

## 2024-07-01 ASSESSMENT — ENCOUNTER SYMPTOMS
SINUS PAIN: 0
ABDOMINAL PAIN: 0
RHINORRHEA: 0
WHEEZING: 0
SHORTNESS OF BREATH: 0
CHEST TIGHTNESS: 0
EYE ITCHING: 0
DIARRHEA: 0
SINUS PRESSURE: 0
COUGH: 0
EYE DISCHARGE: 0
CONSTIPATION: 0
BACK PAIN: 0
NAUSEA: 0
COLOR CHANGE: 0

## 2024-07-01 NOTE — PROGRESS NOTES
hyperlipidemia include beta blockers. Pertinent negatives include no chest pain, myalgias or shortness of breath. Current antihyperlipidemic treatment includes diet change and herbal therapy. The current treatment provides mild improvement of lipids.       Review of Systems   Constitutional:  Negative for chills, fatigue, fever and unexpected weight change.   HENT:  Negative for congestion, ear pain, rhinorrhea, sinus pressure and sinus pain.    Eyes:  Negative for discharge, itching and visual disturbance.   Respiratory:  Negative for cough, chest tightness, shortness of breath and wheezing.    Cardiovascular:  Negative for chest pain, palpitations and leg swelling.   Gastrointestinal:  Negative for abdominal pain, constipation, diarrhea and nausea.   Endocrine: Negative for cold intolerance, heat intolerance, polydipsia and polyphagia.   Genitourinary:  Negative for dysuria, flank pain and frequency.   Musculoskeletal:  Negative for arthralgias, back pain and myalgias.   Skin:  Negative for color change.   Allergic/Immunologic: Negative for environmental allergies and food allergies.   Neurological:  Negative for dizziness, weakness, light-headedness, numbness and headaches.   Psychiatric/Behavioral:  Negative for agitation, behavioral problems and confusion. The patient is not nervous/anxious.           Objective   Physical Exam  Vitals and nursing note reviewed.   Constitutional:       General: He is not in acute distress.     Appearance: Normal appearance. He is not ill-appearing.   HENT:      Head: Normocephalic and atraumatic.      Right Ear: Tympanic membrane and external ear normal.      Left Ear: Tympanic membrane and external ear normal.      Nose: Nose normal. No congestion or rhinorrhea.      Mouth/Throat:      Mouth: Mucous membranes are moist.      Pharynx: Oropharynx is clear. No posterior oropharyngeal erythema.   Eyes:      Pupils: Pupils are equal, round, and reactive to light.   Cardiovascular:

## 2024-07-03 NOTE — ASSESSMENT & PLAN NOTE
At goal, continue current medications and continue current treatment plan continue diet low in saturated fats, low in cholesterol's.  Continue co-Q10, fatty acid supplementation, will recheck lipid panel at next lab draw.

## 2024-07-03 NOTE — ASSESSMENT & PLAN NOTE
At goal, continue current medications and continue current treatment plan initial BP check shows slight elevation, recheck shows normotension.  Will continue on Bystolic at current dose at this time.  Denies any side effects with this medication.  Does not check his blood pressure at home, encouraged to do so daily and report these to the office

## 2024-07-03 NOTE — ASSESSMENT & PLAN NOTE
At goal, continue current medications and continue current treatment plan continue current treatment plan, continue 81 mg aspirin, compression stockings.

## 2024-07-10 DIAGNOSIS — I10 ESSENTIAL HYPERTENSION: ICD-10-CM

## 2024-07-10 RX ORDER — NEBIVOLOL 2.5 MG/1
2.5 TABLET ORAL DAILY
Qty: 90 TABLET | Refills: 0 | Status: SHIPPED | OUTPATIENT
Start: 2024-07-10

## 2024-07-10 NOTE — TELEPHONE ENCOUNTER
Please refill medication since pcp is out of the office thank you    Marcus Mustafa is requesting a refill on the following medication(s):  Requested Prescriptions     Pending Prescriptions Disp Refills    nebivolol (BYSTOLIC) 2.5 MG tablet [Pharmacy Med Name: Nebivolol HCl Oral Tablet 2.5 MG] 90 tablet 0     Sig: TAKE 1 TABLET BY MOUTH EVERY DAY       Last Visit Date (If Applicable):  7/1/2024    Next Visit Date:    10/1/2024

## 2024-08-10 DIAGNOSIS — K21.9 GASTROESOPHAGEAL REFLUX DISEASE WITHOUT ESOPHAGITIS: ICD-10-CM

## 2024-08-12 RX ORDER — OMEPRAZOLE 20 MG/1
CAPSULE, DELAYED RELEASE ORAL
Qty: 90 CAPSULE | Refills: 0 | Status: SHIPPED | OUTPATIENT
Start: 2024-08-12

## 2024-08-12 NOTE — TELEPHONE ENCOUNTER
Marcus Mustafa is requesting a refill on the following medication(s):  Requested Prescriptions     Pending Prescriptions Disp Refills    omeprazole (PRILOSEC) 20 MG delayed release capsule [Pharmacy Med Name: Omeprazole Oral Capsule Delayed Release 20 MG] 90 capsule 0     Sig: TAKE 1 CAPSULE BY MOUTH EVERY DAY       Last Visit Date (If Applicable):  7/1/2024    Next Visit Date:    10/1/2024

## 2024-09-30 LAB
ALBUMIN/GLOBULIN RATIO: 1.6 G/DL
ALBUMIN: 4.5 G/DL (ref 3.5–5)
ALP BLD-CCNC: 51 UNITS/L (ref 38–126)
ALT SERPL-CCNC: 27 UNITS/L (ref 4–50)
ANION GAP SERPL CALCULATED.3IONS-SCNC: 9.9 MMOL/L (ref 3–11)
AST SERPL-CCNC: 36 UNITS/L (ref 17–59)
BASOPHILS ABSOLUTE: 0.06 X10E3/?L (ref 0–0.3)
BASOPHILS RELATIVE PERCENT: 1.25 % (ref 0–3)
BILIRUB SERPL-MCNC: 1 MG/DL (ref 0.2–1.3)
BUN BLDV-MCNC: 16 MG/DL (ref 9–20)
CALCIUM SERPL-MCNC: 10.1 MG/DL (ref 8.4–10.2)
CHLORIDE BLD-SCNC: 104 MMOL/L (ref 98–120)
CHOLESTEROL, TOTAL: 222 MG/DL (ref 50–200)
CHOLESTEROL/HDL RATIO: 4.83 RATIO (ref 0–4.5)
CO2: 29 MMOL/L (ref 22–31)
CREAT SERPL-MCNC: 1.1 MG/DL (ref 0.7–1.3)
DIAGNOSTIC PSA: 3.08 NG/ML (ref 0–4)
EOSINOPHILS ABSOLUTE: 0.14 X10E3/?L (ref 0–1.1)
EOSINOPHILS RELATIVE PERCENT: 2.97 % (ref 0–10)
GFR, ESTIMATED: > 60
GLOBULIN: 2.9 G/DL
GLUCOSE: 97 MG/DL (ref 75–110)
HCT VFR BLD CALC: 56.2 % (ref 42–52)
HDLC SERPL-MCNC: 46 MG/DL (ref 36–68)
HEMOGLOBIN: 17.9 G/DL (ref 13.8–17.8)
LDL CHOLESTEROL: 151.4 MG/DL (ref 0–160)
LYMPHOCYTES ABSOLUTE: 1.87 X10E3/?L (ref 1–5.5)
LYMPHOCYTES RELATIVE PERCENT: 40.49 % (ref 20–51.1)
MCH RBC QN AUTO: 31.9 PG (ref 28.5–32.5)
MCHC RBC AUTO-ENTMCNC: 32.2 G/DL (ref 32–37)
MCV RBC AUTO: 99.2 FL (ref 80–94)
MONOCYTES ABSOLUTE: 0.44 X10E3/?L (ref 0.1–1)
MONOCYTES RELATIVE PERCENT: 9.43 % (ref 1.7–9.3)
NEUTROPHILS ABSOLUTE: 2.12 X10E3/?L (ref 2–8.1)
NEUTROPHILS RELATIVE PERCENT: 45.87 % (ref 42.2–75.2)
PDW BLD-RTO: 11.8 % (ref 10–15.5)
PLATELET # BLD: 174.6 THOU/MM3 (ref 130–400)
POTASSIUM SERPL-SCNC: 4.7 MMOL/L (ref 3.6–5)
RBC # BLD: 5.67 M/UL (ref 4.7–6.1)
SODIUM BLD-SCNC: 143 MMOL/L (ref 135–145)
TOTAL PROTEIN: 7.4 G/DL (ref 6.3–8.2)
TRIGL SERPL-MCNC: 123 MG/DL (ref 10–250)
VLDLC SERPL CALC-MCNC: 25 MG/DL (ref 0–50)
WBC # BLD: 4.6 THOU/ML3 (ref 4.8–10.8)

## 2024-09-30 SDOH — HEALTH STABILITY: PHYSICAL HEALTH: ON AVERAGE, HOW MANY MINUTES DO YOU ENGAGE IN EXERCISE AT THIS LEVEL?: 80 MIN

## 2024-09-30 ASSESSMENT — LIFESTYLE VARIABLES
HOW MANY STANDARD DRINKS CONTAINING ALCOHOL DO YOU HAVE ON A TYPICAL DAY: 1 OR 2
HOW OFTEN DO YOU HAVE A DRINK CONTAINING ALCOHOL: 2
HOW OFTEN DO YOU HAVE SIX OR MORE DRINKS ON ONE OCCASION: 1
HOW MANY STANDARD DRINKS CONTAINING ALCOHOL DO YOU HAVE ON A TYPICAL DAY: 1
HOW OFTEN DO YOU HAVE A DRINK CONTAINING ALCOHOL: MONTHLY OR LESS

## 2024-09-30 ASSESSMENT — PATIENT HEALTH QUESTIONNAIRE - PHQ9
1. LITTLE INTEREST OR PLEASURE IN DOING THINGS: NOT AT ALL
SUM OF ALL RESPONSES TO PHQ QUESTIONS 1-9: 0
SUM OF ALL RESPONSES TO PHQ9 QUESTIONS 1 & 2: 0
2. FEELING DOWN, DEPRESSED OR HOPELESS: NOT AT ALL
SUM OF ALL RESPONSES TO PHQ QUESTIONS 1-9: 0

## 2024-10-01 ENCOUNTER — OFFICE VISIT (OUTPATIENT)
Dept: FAMILY MEDICINE CLINIC | Age: 69
End: 2024-10-01
Payer: MEDICARE

## 2024-10-01 VITALS
DIASTOLIC BLOOD PRESSURE: 80 MMHG | SYSTOLIC BLOOD PRESSURE: 115 MMHG | HEIGHT: 72 IN | OXYGEN SATURATION: 98 % | BODY MASS INDEX: 33.18 KG/M2 | HEART RATE: 63 BPM | WEIGHT: 245 LBS

## 2024-10-01 DIAGNOSIS — Z00.00 MEDICARE ANNUAL WELLNESS VISIT, SUBSEQUENT: Primary | ICD-10-CM

## 2024-10-01 PROCEDURE — G0439 PPPS, SUBSEQ VISIT: HCPCS

## 2024-10-01 PROCEDURE — 1123F ACP DISCUSS/DSCN MKR DOCD: CPT

## 2024-10-01 PROCEDURE — 99211 OFF/OP EST MAY X REQ PHY/QHP: CPT

## 2024-10-01 PROCEDURE — 3017F COLORECTAL CA SCREEN DOC REV: CPT

## 2024-10-01 PROCEDURE — 3074F SYST BP LT 130 MM HG: CPT

## 2024-10-01 PROCEDURE — 3079F DIAST BP 80-89 MM HG: CPT

## 2024-10-01 PROCEDURE — G8484 FLU IMMUNIZE NO ADMIN: HCPCS

## 2024-10-01 NOTE — PROGRESS NOTES
R, APRN - CNP as PCP - Empaneled Provider      Reviewed and updated this visit:  Tobacco  Allergies  Meds  Problems  Med Hx  Surg Hx  Soc Hx  Fam Hx

## 2024-10-01 NOTE — PATIENT INSTRUCTIONS
If you have questions about a medical condition or this instruction, always ask your healthcare professional. [employer], [facility] disclaims any warranty or liability for your use of this information.           A Healthy Heart: Care Instructions  Overview     Coronary artery disease, also called heart disease, occurs when a substance called plaque builds up in the vessels that supply oxygen-rich blood to your heart muscle. This can narrow the blood vessels and reduce blood flow. A heart attack happens when blood flow is completely blocked. A high-fat diet, smoking, and other factors increase the risk of heart disease.  Your doctor has found that you have a chance of having heart disease. A heart-healthy lifestyle can help keep your heart healthy and prevent heart disease. This lifestyle includes eating healthy, being active, staying at a weight that's healthy for you, and not smoking or using tobacco. It also includes taking medicines as directed, managing other health conditions, and trying to get a healthy amount of sleep.  Follow-up care is a key part of your treatment and safety. Be sure to make and go to all appointments, and call your doctor if you are having problems. It's also a good idea to know your test results and keep a list of the medicines you take.  How can you care for yourself at home?  Diet    Use less salt when you cook and eat. This helps lower your blood pressure. Taste food before salting. Add only a little salt when you think you need it. With time, your taste buds will adjust to less salt.     Eat fewer snack items, fast foods, canned soups, and other high-salt, high-fat, processed foods.     Read food labels and try to avoid saturated and trans fats. They increase your risk of heart disease by raising cholesterol levels.     Limit the amount of solid fat--butter, margarine, and shortening--you eat. Use olive, peanut, or canola oil when you cook. Bake, broil, and steam foods instead of

## 2024-10-07 DIAGNOSIS — I10 ESSENTIAL HYPERTENSION: ICD-10-CM

## 2024-10-07 RX ORDER — NEBIVOLOL 2.5 MG/1
2.5 TABLET ORAL DAILY
Qty: 90 TABLET | Refills: 0 | Status: SHIPPED | OUTPATIENT
Start: 2024-10-07

## 2024-10-07 NOTE — TELEPHONE ENCOUNTER
Marcus Mustafa is requesting a refill on the following medication(s):  Requested Prescriptions     Pending Prescriptions Disp Refills    nebivolol (BYSTOLIC) 2.5 MG tablet 90 tablet 0     Sig: Take 1 tablet by mouth daily       Last Visit Date (If Applicable):  10/1/2024    Next Visit Date:    4/1/2025

## 2024-10-14 DIAGNOSIS — I10 ESSENTIAL HYPERTENSION: ICD-10-CM

## 2024-10-14 RX ORDER — NEBIVOLOL 2.5 MG/1
2.5 TABLET ORAL DAILY
Qty: 90 TABLET | Refills: 0 | Status: SHIPPED | OUTPATIENT
Start: 2024-10-14

## 2024-10-14 NOTE — TELEPHONE ENCOUNTER
Marcus Ramsey is requesting a refill on the following medication(s):  Requested Prescriptions     Pending Prescriptions Disp Refills    nebivolol (BYSTOLIC) 2.5 MG tablet 90 tablet 0     Sig: Take 1 tablet by mouth daily       Last Visit Date (If Applicable):  10/1/2024    Next Visit Date:    4/1/2025      Marcus Ramsey is requesting a refill on the following medication(s):  Requested Prescriptions     Pending Prescriptions Disp Refills    nebivolol (BYSTOLIC) 2.5 MG tablet 90 tablet 0     Sig: Take 1 tablet by mouth daily       Last Visit Date (If Applicable):  10/1/2024    Next Visit Date:    4/1/2025

## 2024-11-06 DIAGNOSIS — K21.9 GASTROESOPHAGEAL REFLUX DISEASE WITHOUT ESOPHAGITIS: ICD-10-CM

## 2024-11-12 DIAGNOSIS — K21.9 GASTROESOPHAGEAL REFLUX DISEASE WITHOUT ESOPHAGITIS: ICD-10-CM

## 2024-11-13 ENCOUNTER — TELEPHONE (OUTPATIENT)
Dept: FAMILY MEDICINE CLINIC | Age: 69
End: 2024-11-13

## 2024-11-13 DIAGNOSIS — K21.9 GASTROESOPHAGEAL REFLUX DISEASE WITHOUT ESOPHAGITIS: ICD-10-CM

## 2024-12-27 ENCOUNTER — OFFICE VISIT (OUTPATIENT)
Dept: FAMILY MEDICINE CLINIC | Age: 69
End: 2024-12-27
Payer: MEDICARE

## 2024-12-27 VITALS
HEART RATE: 66 BPM | WEIGHT: 255.8 LBS | DIASTOLIC BLOOD PRESSURE: 82 MMHG | BODY MASS INDEX: 34.69 KG/M2 | OXYGEN SATURATION: 99 % | SYSTOLIC BLOOD PRESSURE: 126 MMHG

## 2024-12-27 DIAGNOSIS — K62.5 RECTAL BLEEDING: Primary | ICD-10-CM

## 2024-12-27 PROCEDURE — 1036F TOBACCO NON-USER: CPT | Performed by: STUDENT IN AN ORGANIZED HEALTH CARE EDUCATION/TRAINING PROGRAM

## 2024-12-27 PROCEDURE — 3017F COLORECTAL CA SCREEN DOC REV: CPT | Performed by: STUDENT IN AN ORGANIZED HEALTH CARE EDUCATION/TRAINING PROGRAM

## 2024-12-27 PROCEDURE — 99212 OFFICE O/P EST SF 10 MIN: CPT | Performed by: STUDENT IN AN ORGANIZED HEALTH CARE EDUCATION/TRAINING PROGRAM

## 2024-12-27 PROCEDURE — 3079F DIAST BP 80-89 MM HG: CPT | Performed by: STUDENT IN AN ORGANIZED HEALTH CARE EDUCATION/TRAINING PROGRAM

## 2024-12-27 PROCEDURE — G8427 DOCREV CUR MEDS BY ELIG CLIN: HCPCS | Performed by: STUDENT IN AN ORGANIZED HEALTH CARE EDUCATION/TRAINING PROGRAM

## 2024-12-27 PROCEDURE — 3074F SYST BP LT 130 MM HG: CPT | Performed by: STUDENT IN AN ORGANIZED HEALTH CARE EDUCATION/TRAINING PROGRAM

## 2024-12-27 PROCEDURE — 1123F ACP DISCUSS/DSCN MKR DOCD: CPT | Performed by: STUDENT IN AN ORGANIZED HEALTH CARE EDUCATION/TRAINING PROGRAM

## 2024-12-27 PROCEDURE — G8417 CALC BMI ABV UP PARAM F/U: HCPCS | Performed by: STUDENT IN AN ORGANIZED HEALTH CARE EDUCATION/TRAINING PROGRAM

## 2024-12-27 PROCEDURE — 99214 OFFICE O/P EST MOD 30 MIN: CPT | Performed by: STUDENT IN AN ORGANIZED HEALTH CARE EDUCATION/TRAINING PROGRAM

## 2024-12-27 PROCEDURE — G8484 FLU IMMUNIZE NO ADMIN: HCPCS | Performed by: STUDENT IN AN ORGANIZED HEALTH CARE EDUCATION/TRAINING PROGRAM

## 2025-01-07 DIAGNOSIS — I10 ESSENTIAL HYPERTENSION: ICD-10-CM

## 2025-01-08 RX ORDER — NEBIVOLOL 2.5 MG/1
2.5 TABLET ORAL DAILY
Qty: 90 TABLET | Refills: 2 | Status: SHIPPED | OUTPATIENT
Start: 2025-01-08

## 2025-01-08 NOTE — TELEPHONE ENCOUNTER
Marcus Mustafa is requesting a refill on the following medication(s):  Requested Prescriptions     Pending Prescriptions Disp Refills    nebivolol (BYSTOLIC) 2.5 MG tablet [Pharmacy Med Name: Nebivolol HCl Oral Tablet 2.5 MG] 90 tablet 0     Sig: TAKE 1 TABLET BY MOUTH EVERY DAY       Last Visit Date (If Applicable):  12/27/2024    Next Visit Date:    4/1/2025

## 2025-01-20 ENCOUNTER — INITIAL CONSULT (OUTPATIENT)
Dept: SURGERY | Age: 70
End: 2025-01-20
Payer: MEDICARE

## 2025-01-20 ENCOUNTER — PREP FOR PROCEDURE (OUTPATIENT)
Dept: SURGERY | Age: 70
End: 2025-01-20

## 2025-01-20 VITALS
TEMPERATURE: 97.7 F | OXYGEN SATURATION: 95 % | HEIGHT: 72 IN | DIASTOLIC BLOOD PRESSURE: 80 MMHG | SYSTOLIC BLOOD PRESSURE: 110 MMHG | WEIGHT: 253 LBS | HEART RATE: 71 BPM | BODY MASS INDEX: 34.27 KG/M2

## 2025-01-20 DIAGNOSIS — R14.0 BLOATING: ICD-10-CM

## 2025-01-20 DIAGNOSIS — K62.89 RECTAL PAIN: ICD-10-CM

## 2025-01-20 DIAGNOSIS — R10.13 EPIGASTRIC PAIN: ICD-10-CM

## 2025-01-20 DIAGNOSIS — K62.5 RECTAL BLEEDING: Primary | ICD-10-CM

## 2025-01-20 LAB
ALBUMIN/GLOBULIN RATIO: 1.8 G/DL
ALBUMIN: 4.6 G/DL (ref 3.5–5)
ALP BLD-CCNC: 49 UNITS/L (ref 38–126)
ALT SERPL-CCNC: 31 UNITS/L (ref 4–50)
ANION GAP SERPL CALCULATED.3IONS-SCNC: 7.3 MMOL/L (ref 3–11)
AST SERPL-CCNC: 40 UNITS/L (ref 17–59)
BASOPHILS ABSOLUTE: 0.07 X10E3/?L (ref 0–0.3)
BASOPHILS RELATIVE PERCENT: 1.19 % (ref 0–3)
BILIRUB SERPL-MCNC: 1 MG/DL (ref 0.2–1.3)
BUN BLDV-MCNC: 15 MG/DL (ref 9–20)
CALCIUM SERPL-MCNC: 9.4 MG/DL (ref 8.4–10.2)
CHLORIDE BLD-SCNC: 103 MMOL/L (ref 98–120)
CO2: 32 MMOL/L (ref 22–31)
CREAT SERPL-MCNC: 1 MG/DL (ref 0.7–1.3)
EOSINOPHILS ABSOLUTE: 0.13 X10E3/?L (ref 0–1.1)
EOSINOPHILS RELATIVE PERCENT: 2.42 % (ref 0–10)
GFR, ESTIMATED: > 60
GLOBULIN: 2.5 G/DL
GLUCOSE: 102 MG/DL (ref 75–110)
HCT VFR BLD CALC: 54.6 % (ref 42–52)
HEMOGLOBIN: 17.6 G/DL (ref 13.8–17.8)
LYMPHOCYTES ABSOLUTE: 2.4 X10E3/?L (ref 1–5.5)
LYMPHOCYTES RELATIVE PERCENT: 43.09 % (ref 20–51.1)
MCH RBC QN AUTO: 31.1 PG (ref 28.5–32.5)
MCHC RBC AUTO-ENTMCNC: 32.3 G/DL (ref 32–37)
MCV RBC AUTO: 96.4 FL (ref 80–94)
MONOCYTES ABSOLUTE: 0.5 X10E3/?L (ref 0.1–1)
MONOCYTES RELATIVE PERCENT: 8.89 % (ref 1.7–9.3)
NEUTROPHILS ABSOLUTE: 2.47 X10E3/?L (ref 2–8.1)
NEUTROPHILS RELATIVE PERCENT: 44.41 % (ref 42.2–75.2)
PDW BLD-RTO: 11.7 % (ref 10–15.5)
PLATELET # BLD: 200.3 THOU/MM3 (ref 130–400)
POTASSIUM SERPL-SCNC: 4.3 MMOL/L (ref 3.6–5)
RBC # BLD: 5.67 M/UL (ref 4.7–6.1)
SODIUM BLD-SCNC: 138 MMOL/L (ref 135–145)
TOTAL PROTEIN: 7.1 G/DL (ref 6.3–8.2)
WBC # BLD: 5.6 THOU/ML3 (ref 4.8–10.8)

## 2025-01-20 PROCEDURE — 3079F DIAST BP 80-89 MM HG: CPT | Performed by: SURGERY

## 2025-01-20 PROCEDURE — 1159F MED LIST DOCD IN RCRD: CPT | Performed by: SURGERY

## 2025-01-20 PROCEDURE — 1036F TOBACCO NON-USER: CPT | Performed by: SURGERY

## 2025-01-20 PROCEDURE — 99214 OFFICE O/P EST MOD 30 MIN: CPT | Performed by: SURGERY

## 2025-01-20 PROCEDURE — 1123F ACP DISCUSS/DSCN MKR DOCD: CPT | Performed by: SURGERY

## 2025-01-20 PROCEDURE — G8417 CALC BMI ABV UP PARAM F/U: HCPCS | Performed by: SURGERY

## 2025-01-20 PROCEDURE — 3074F SYST BP LT 130 MM HG: CPT | Performed by: SURGERY

## 2025-01-20 PROCEDURE — 99215 OFFICE O/P EST HI 40 MIN: CPT | Performed by: SURGERY

## 2025-01-20 PROCEDURE — G8427 DOCREV CUR MEDS BY ELIG CLIN: HCPCS | Performed by: SURGERY

## 2025-01-20 PROCEDURE — 3017F COLORECTAL CA SCREEN DOC REV: CPT | Performed by: SURGERY

## 2025-01-20 NOTE — PROGRESS NOTES
Colonoscopy    Abd pain: yes, epigastric pain when sitting down- pressure after eating  Anemia: no  Bloating:yes, after eating  Diarrhea: no  Constipation: no  Melena: no  Hematochezia:no  Rectal Bleeding:yes, occasionally bright red blood  Rectal/Anal Pain:no  Pruritus: no  Family history colon Cancer: no  Previous colon cancer: no  Previous Colon Polyp: no  Change in bowels: no  Decrease caliber of stool: no  Change in color of stool: no    Previous work up date[facility] of scattered diverticula in sigmoid colon and internal hemorrhoids      Nausea: yes, prior to eating  Vomiting: no  Heartburn:no  Dysphagia:no  Hematemesis:no  Epigastric pain:yes, when sitting down  Anemia: no  History of Barrets: no  Family history of esophageal/gastric cancer: no  Previous work up date:2- EGD done in [facility] with findings of stricture and was dilated  Current Treatment:Prilosec 20 mg daily        
Negative  Swollen Lymph Nodes: Negative    Lungs:  Cough: Negative  SOB: Negative  Wheezing:Negative    Cardiovascular:  Chest Pain: Negative  Palpitations:Negative    GI:   Decreased Appetite: Negative  Heartburn: Negative  Dysphagia: Negative  Nausea/Vomiting: Negative  Abdominal Pain: Negative  Change in Bowels:Negative  Constipation: Negative  Diarrhea: Negative  Rectal Bleeding: Negative    :   Dysuria: Negative  Increase Urinary Frequency/Urgency: Negative    Neuro:  Seizures: Negative  Confusion: Negative        PAST MEDICAL HISTORY:      Family History   Problem Relation Age of Onset    Breast Cancer Mother     Other Mother         irregular heart rate    Heart Disease Father 62    Atrial Fibrillation Father     Diabetes Father     Coronary Art Dis Father     Diabetes Maternal Grandmother     Diabetes Paternal Grandfather      Social History     Socioeconomic History    Marital status:      Spouse name: Not on file    Number of children: Not on file    Years of education: Not on file    Highest education level: Not on file   Occupational History    Not on file   Tobacco Use    Smoking status: Never    Smokeless tobacco: Never   Substance and Sexual Activity    Alcohol use: Yes     Comment: very occasionally    Drug use: Never    Sexual activity: Not on file   Other Topics Concern    Not on file   Social History Narrative    Not on file     Social Determinants of Health     Financial Resource Strain: Low Risk  (6/20/2023)    Overall Financial Resource Strain (CARDIA)     Difficulty of Paying Living Expenses: Not hard at all   Food Insecurity: Not on file (6/30/2024)   Transportation Needs: Unknown (6/20/2023)    PRAPARE - Transportation     Lack of Transportation (Medical): Not on file     Lack of Transportation (Non-Medical): No   Physical Activity: Sufficiently Active (9/30/2024)    Exercise Vital Sign     Days of Exercise per Week: 3 days     Minutes of Exercise per Session: 80 min   Stress: Not

## 2025-01-21 NOTE — PROGRESS NOTES
[address], [address]  [place] [address]  Phone: [phone]  Fax: [phone]      Date of Visit:  24  Patient Name: Marcus Mustafa   Patient :  1955     ASSESSMENT/PLAN     1. Rectal bleeding  -     [facility] [place]     Intermittent episode of bright red blood per rectum.  Will order lab work including CBC and CMP.  Have also refer to general surgery for colonoscopy.  Declines rectal examination today.  No other red flag signs or symptoms at this time, however do still feel rectal bleeding requires definitive evaluation with colonoscopy.    Immediate follow-up with any worsening of symptoms.  Patient given precautions.    - Questions/concerns answered. Patient verbalized and expressed understanding. Medications, laboratory testing, imaging, consultation, and follow up as documented in this encounter.       HPI:     Marcus Mustafa is a 69 y.o. male with   Patient Active Problem List   Diagnosis    Essential (primary) hypertension    Diverticulosis of large intestine without hemorrhage    Schatzki's ring of distal esophagus    Pain of left hip joint    Internal hemorrhoids    Hyperlipidemia    Gastroesophageal reflux disease without esophagitis    Peripheral vascular disease (HCC)    Sciatica of left side    BMI 31.0-31.9,adult    Greater trochanteric bursitis of right hip    Hammer toe of right foot    Epigastric pain    Bloating    Rectal pain     who presents today to discuss   Chief Complaint   Patient presents with    Rectal Bleeding     Epigastric pain, blood in stools.  Bloody stools have been on and off. Thought it was diverticulitis but isnt sure. Hemorrhoids are internal according to colonoscopy in 2017.        HPI: Patient presents today with some episodes of rectal bleeding over the last couple months, have gotten more frequent.  Bright red blood in the toilet bowl here over the last week.

## 2025-01-24 NOTE — H&P
Marcus Mustafa is a 69 y.o. male        CC:     Epigastric pain  Rectal bleeding     HISTORY OF PRESENT ILLNESS:     Patient is a 69-year-old male who has some rectal bleeding with bright red blood.  He has had several large bloody bowel movements over the last 3 months.  He does not have any pain associated with the bloody bowel movements.  He will have 1 or 2 in a row and then it is back to normal for a while.  He also has some epigastric pain which has been there since we did the hernia repair and before the hernia repair a year ago.  He has had a history of GERD and a stricture dilated.  His last EGD was in 2017 as was his colonoscopy.     Colonoscopy     Abd pain: yes, epigastric pain when sitting down- pressure after eating  Anemia: no  Bloating:yes, after eating  Diarrhea: no  Constipation: no  Melena: no  Hematochezia:no  Rectal Bleeding:yes, occasionally bright red blood  Rectal/Anal Pain:no  Pruritus: no  Family history colon Cancer: no  Previous colon cancer: no  Previous Colon Polyp: no  Change in bowels: no  Decrease caliber of stool: no  Change in color of stool: no     Previous work up date: 1[facility] with findings of scattered diverticula in sigmoid colon and internal hemorrhoids        Nausea: yes, prior to eating  Vomiting: no  Heartburn:no  Dysphagia:no  Hematemesis:no  Epigastric pain:yes, when sitting down  Anemia: no  History of Barrets: no  Family history of esophageal/gastric cancer: no  Previous work up date:2- EGD done in [place] with findings of stricture and was dilated  Current Treatment:Prilosec 20 mg daily        Past Medical History:   Diagnosis Date    GERD (gastroesophageal reflux disease)     Hypertension        Past Surgical History:   Procedure Laterality Date    COLONOSCOPY N/A     3/07; 1/9/17- by Dr George with findings of scattered diverticula in sigmoid colon and interna hemorrhoids    SHOULDER SURGERY Right     UMBILICAL HERNIA

## 2025-01-28 ENCOUNTER — APPOINTMENT (OUTPATIENT)
Dept: CT IMAGING | Age: 70
End: 2025-01-28
Attending: SURGERY
Payer: MEDICARE

## 2025-01-28 ENCOUNTER — ANESTHESIA (OUTPATIENT)
Dept: OPERATING ROOM | Age: 70
End: 2025-01-28
Payer: MEDICARE

## 2025-01-28 ENCOUNTER — HOSPITAL ENCOUNTER (OUTPATIENT)
Age: 70
Setting detail: OUTPATIENT SURGERY
Discharge: HOME | End: 2025-01-28
Attending: SURGERY | Admitting: SURGERY
Payer: MEDICARE

## 2025-01-28 ENCOUNTER — ANESTHESIA EVENT (OUTPATIENT)
Dept: OPERATING ROOM | Age: 70
End: 2025-01-28
Payer: MEDICARE

## 2025-01-28 VITALS
SYSTOLIC BLOOD PRESSURE: 142 MMHG | BODY MASS INDEX: 33.86 KG/M2 | OXYGEN SATURATION: 97 % | RESPIRATION RATE: 16 BRPM | WEIGHT: 250 LBS | TEMPERATURE: 97.8 F | HEIGHT: 72 IN | HEART RATE: 60 BPM | DIASTOLIC BLOOD PRESSURE: 80 MMHG

## 2025-01-28 DIAGNOSIS — R14.0 BLOATING: ICD-10-CM

## 2025-01-28 DIAGNOSIS — K62.89 RECTAL PAIN: ICD-10-CM

## 2025-01-28 DIAGNOSIS — R10.13 EPIGASTRIC PAIN: ICD-10-CM

## 2025-01-28 PROCEDURE — 2709999900 HC NON-CHARGEABLE SUPPLY: Performed by: SURGERY

## 2025-01-28 PROCEDURE — 6360000004 HC RX CONTRAST MEDICATION: Performed by: SURGERY

## 2025-01-28 PROCEDURE — 3700000001 HC ADD 15 MINUTES (ANESTHESIA): Performed by: SURGERY

## 2025-01-28 PROCEDURE — 7100000011 HC PHASE II RECOVERY - ADDTL 15 MIN: Performed by: SURGERY

## 2025-01-28 PROCEDURE — 7100000010 HC PHASE II RECOVERY - FIRST 15 MIN: Performed by: SURGERY

## 2025-01-28 PROCEDURE — 2709999900 CT ABDOMEN PELVIS W IV CONTRAST

## 2025-01-28 PROCEDURE — 2580000003 HC RX 258: Performed by: SURGERY

## 2025-01-28 PROCEDURE — 3609012400 HC EGD TRANSORAL BIOPSY SINGLE/MULTIPLE: Performed by: SURGERY

## 2025-01-28 PROCEDURE — 88305 TISSUE EXAM BY PATHOLOGIST: CPT

## 2025-01-28 PROCEDURE — 3700000000 HC ANESTHESIA ATTENDED CARE: Performed by: SURGERY

## 2025-01-28 PROCEDURE — 6360000002 HC RX W HCPCS: Performed by: NURSE ANESTHETIST, CERTIFIED REGISTERED

## 2025-01-28 PROCEDURE — 3609010300 HC COLONOSCOPY W/BIOPSY SINGLE/MULTIPLE: Performed by: SURGERY

## 2025-01-28 PROCEDURE — 2500000003 HC RX 250 WO HCPCS: Performed by: NURSE ANESTHETIST, CERTIFIED REGISTERED

## 2025-01-28 RX ORDER — DEXMEDETOMIDINE HYDROCHLORIDE 100 UG/ML
INJECTION, SOLUTION INTRAVENOUS
Status: DISCONTINUED | OUTPATIENT
Start: 2025-01-28 | End: 2025-01-28 | Stop reason: SDUPTHER

## 2025-01-28 RX ORDER — PROPOFOL 10 MG/ML
INJECTION, EMULSION INTRAVENOUS
Status: DISCONTINUED | OUTPATIENT
Start: 2025-01-28 | End: 2025-01-28 | Stop reason: SDUPTHER

## 2025-01-28 RX ORDER — SODIUM CHLORIDE, SODIUM LACTATE, POTASSIUM CHLORIDE, CALCIUM CHLORIDE 600; 310; 30; 20 MG/100ML; MG/100ML; MG/100ML; MG/100ML
INJECTION, SOLUTION INTRAVENOUS CONTINUOUS
Status: DISCONTINUED | OUTPATIENT
Start: 2025-01-28 | End: 2025-01-28 | Stop reason: HOSPADM

## 2025-01-28 RX ORDER — SODIUM CHLORIDE 0.9 % (FLUSH) 0.9 %
5-40 SYRINGE (ML) INJECTION EVERY 12 HOURS SCHEDULED
Status: DISCONTINUED | OUTPATIENT
Start: 2025-01-28 | End: 2025-01-28 | Stop reason: HOSPADM

## 2025-01-28 RX ORDER — SODIUM CHLORIDE 0.9 % (FLUSH) 0.9 %
5-40 SYRINGE (ML) INJECTION PRN
Status: DISCONTINUED | OUTPATIENT
Start: 2025-01-28 | End: 2025-01-28 | Stop reason: HOSPADM

## 2025-01-28 RX ORDER — SODIUM CHLORIDE 9 MG/ML
25 INJECTION, SOLUTION INTRAVENOUS PRN
Status: DISCONTINUED | OUTPATIENT
Start: 2025-01-28 | End: 2025-01-28 | Stop reason: HOSPADM

## 2025-01-28 RX ORDER — LIDOCAINE HYDROCHLORIDE 40 MG/ML
INJECTION, SOLUTION RETROBULBAR
Status: DISCONTINUED | OUTPATIENT
Start: 2025-01-28 | End: 2025-01-28 | Stop reason: SDUPTHER

## 2025-01-28 RX ORDER — IOPAMIDOL 755 MG/ML
100 INJECTION, SOLUTION INTRAVASCULAR
Status: COMPLETED | OUTPATIENT
Start: 2025-01-28 | End: 2025-01-28

## 2025-01-28 RX ADMIN — LIDOCAINE HYDROCHLORIDE 60 MG: 40 INJECTION, SOLUTION RETROBULBAR; TOPICAL at 09:33

## 2025-01-28 RX ADMIN — IOPAMIDOL 100 ML: 755 INJECTION, SOLUTION INTRAVENOUS at 10:35

## 2025-01-28 RX ADMIN — PROPOFOL 100 MG: 10 INJECTION, EMULSION INTRAVENOUS at 09:33

## 2025-01-28 RX ADMIN — SODIUM CHLORIDE, POTASSIUM CHLORIDE, SODIUM LACTATE AND CALCIUM CHLORIDE: 600; 310; 30; 20 INJECTION, SOLUTION INTRAVENOUS at 08:13

## 2025-01-28 RX ADMIN — PROPOFOL 180 MCG/KG/MIN: 10 INJECTION, EMULSION INTRAVENOUS at 09:34

## 2025-01-28 RX ADMIN — DEXMEDETOMIDINE HYDROCHLORIDE 10 MCG: 100 INJECTION, SOLUTION INTRAVENOUS at 09:33

## 2025-01-28 ASSESSMENT — PAIN - FUNCTIONAL ASSESSMENT
PAIN_FUNCTIONAL_ASSESSMENT: 0-10
PAIN_FUNCTIONAL_ASSESSMENT: 0-10

## 2025-01-28 NOTE — OP NOTE
PROCEDURE NOTE    DATE OF PROCEDURE: 1/28/2025     SURGEON: Dr. Maurice Navarro    ASSISTANT: None    PREOPERATIVE DIAGNOSIS:      Rectal bleeding: rule out diverticular , tumor, colitis and/or hemorrhoids  Epigastric pain, rule out GERD  No family hx colon cancer  Last EGD/CS 2017 Dr George- divericula and hemorrhoids, and esophageal    stricture and dilated.  Currently on prilosec 20 mg daily     OPERATION: 1.  Upper GI endoscopy with cold biopsy    2.  Colonoscopy with cold forceps and injection of blue dye    ANESTHESIA: IV sedation per CRNA.     ESTIMATED BLOOD LOSS: Less than 10 ml    COMPLICATIONS: None.       PROCEDURE # 1:  EGD    PROCEDURE: The patient was given IV conscious sedation per anesthesia. The patient was given 4 L of O2 /minute by nasal cannula.  The patient's SPO2 remained above 90% throughout the procedure. The gastroscope was inserted orally and advanced under direct vision through the esophagus, through the stomach, through the pylorus, and into the descending duodenum.  Random biopsies were taken in the antrum,body and distal esophagus. The scope was removed and the patient tolerated the procedure well.      Findings:    GE junction at 38 cm  Distal esophagitis up about 2 cm  Mild distal stricture  Ulceration upper esophagus at 20 cm, bx taken      PROCEDURE # 2:  COLONOSCOPY      PROCEDURE: The patient was given IV conscious sedation per anesthesia. The patient was given O2 by nasal cannula.  The patient's SPO2 remained above 90% throughout the procedure. The colonoscope was inserted per rectum and advanced under direct vision to the cecum without difficulty.  The prep was good so exam was adequate.  The colon was decompressed and the scope was removed.  The patient tolerated the procedure well.       Findings:    1. Few scattered diverticuli  2.  Small polyp or irregular mucosa at 25 cm, bx with cold forceps and then about 10 cc of purulent drainage came out and there was a small hole or

## 2025-01-28 NOTE — DISCHARGE INSTRUCTIONS
DISCHARGE INSTRUCTIONS FOLLOWING EGD & COLONOSCOPY    Activity  You have received sedation.  Your judgement and coordination may be impaired.  Do not drive or operate any machinery today.  Do not make personal, medical, legal or business decisions today.  Do not consume alcohol, tranquilizers or sleeping medications today unless otherwise instructed by your physician.  Rest today.  You may resume normal activity tomorrow.    Because air is put into your stomach and colon during these procedures, it is normal to belch, and pass large amounts of air from your rectum.  Feelings of bloating, gas or cramping may persist for 24 hours.  You may not have a bowel movement for 1-3 days after these procedures.    Diet  Begin with sips of clear liquids and if no nausea, you may progress to your normal diet, unless otherwise instructed.    Medications  You may resume your usual medications, unless otherwise instructed.    Follow-Up  The office will call you with biopsy results.    CALL YOUR PHYSICIAN if you experience any of the following:  Bleeding from your rectum (a teaspoonful or more)  Severe abdominal pain/cramping and/or distention that is not relieved by passing gas.  Chest pain that is not relieved by belching.  Persistent nausea and vomiting.  Signs of infection including fever, chills, redness or swelling @ the IV site.    If you have questions/problems, call [phone] ([facility]) or after regular business hours call [phone] ([facility])

## 2025-01-28 NOTE — ANESTHESIA POSTPROCEDURE EVALUATION
Department of Anesthesiology  Postprocedure Note    Patient: Marcus Mustafa  MRN: [id number]  YOB: 1955  Date of evaluation: 1/28/2025    Procedure Summary       Date: 01/28/25 Room / Location: [facility]    Anesthesia Start: 0932 Anesthesia Stop: 1004    Procedures:       ESOPHAGOGASTRODUODENOSCOPY BIOPSY      COLONOSCOPY BIOPSY, INK INJECTION AT 25 cm Diagnosis:       Epigastric pain      Bloating      Rectal pain      (Epigastric pain [R10.13])      (Bloating [R14.0])      (Rectal pain [K62.89])    Surgeons: Maurice Navarro MD Responsible Provider: Adonis Denton APRN - CRNA    Anesthesia Type: General, TIVA ASA Status: 3            Anesthesia Type: General, TIVA    Zaheer Phase I: Zaheer Score: 10    Zaheer Phase II:      Anesthesia Post Evaluation    Patient location during evaluation: bedside  Level of consciousness: sleepy but conscious  Airway patency: patent  Nausea & Vomiting: no nausea and no vomiting  Cardiovascular status: hemodynamically stable  Respiratory status: spontaneous ventilation  Hydration status: stable  Pain management: satisfactory to patient    No notable events documented.

## 2025-01-28 NOTE — ANESTHESIA PRE PROCEDURE
Department of Anesthesiology  Preprocedure Note       Name:  Marcus Mustfaa   Age:  69 y.o.  :  1955                                          MRN:  [id number]         Date:  2025      Surgeon: Surgeon(s):  Maurice Navarro MD    Procedure: Procedure(s):  Esophagogastroduodenoscopy  Colonoscopy    Medications prior to admission:   Prior to Admission medications    Medication Sig Start Date End Date Taking? Authorizing Provider   nebivolol (BYSTOLIC) 2.5 MG tablet TAKE 1 TABLET BY MOUTH EVERY DAY 25  Yes Ovi Lamb APRN - CNP   omeprazole (PRILOSEC) 20 MG delayed release capsule Take 1 capsule by mouth every morning (before breakfast) 24  Yes Ovi Lamb APRN - CNP   vitamin D 25 MCG (1000 UT) CAPS Take 1 capsule by mouth Daily    Girish Chacon MD   Multiple Vitamin (MULTIVITAMIN ADULT PO) Take by mouth    Girish Chacon MD   Coenzyme Q10 (CO Q-10) 100 MG CAPS Take by mouth    Girish Chacon MD   Potassium 99 MG TABS Take by mouth    Girish Chacon MD   Omega-3 Fatty Acids (FISH OIL) 1000 MG CAPS Take 3 capsules by mouth 3 times daily    Girish Chacon MD   magnesium (MAGNESIUM-OXIDE) 250 MG TABS tablet Take 1 tablet by mouth daily    ProviderGirish MD       Current medications:    Current Facility-Administered Medications   Medication Dose Route Frequency Provider Last Rate Last Admin    lactated ringers infusion   IntraVENous Continuous Maurice Navarro MD 75 mL/hr at 25 0813 New Bag at 25 0813    sodium chloride flush 0.9 % injection 5-40 mL  5-40 mL IntraVENous 2 times per day Maurice Navarro MD        sodium chloride flush 0.9 % injection 5-40 mL  5-40 mL IntraVENous PRN Maurice Navarro MD        0.9 % sodium chloride infusion  25 mL IntraVENous PRN Maurice Navarro MD           Allergies:    Allergies   Allergen Reactions    Gabapentin Other (See Comments)     Dizziness reported    Augmentin [Amoxicillin-Pot Clavulanate] Rash       Problem

## 2025-01-30 LAB — SURGICAL PATHOLOGY REPORT: NORMAL

## 2025-02-04 ENCOUNTER — TELEPHONE (OUTPATIENT)
Dept: SURGERY | Age: 70
End: 2025-02-04

## 2025-02-04 NOTE — TELEPHONE ENCOUNTER
Patient had called and would like his results of the EGD and CS that was done on 1-28-25. Explained to patient that Dr Navarro had not reviewed it yet.  Had gone over  results without Dr Navarro's recommendations and patient verbalized understanding. Patient states that he had increase Prilosec 20 mg to twice daily and not doing the Pepcid.    Please do endo review

## 2025-02-10 RX ORDER — FAMOTIDINE 20 MG/1
20 TABLET, FILM COATED ORAL NIGHTLY
Qty: 90 TABLET | Refills: 1 | Status: SHIPPED | OUTPATIENT
Start: 2025-02-10

## 2025-02-10 NOTE — TELEPHONE ENCOUNTER
Contacted patient with results from recent EGD and CS at [facility] with Dr. Navarro on 1/28/2025. Updated history, health maintenance, and recall. Forwarded results to PCP.  Medications sent to pharmacy and f/u apt scheduled.

## 2025-04-08 ENCOUNTER — OFFICE VISIT (OUTPATIENT)
Dept: SURGERY | Age: 70
End: 2025-04-08
Payer: MEDICARE

## 2025-04-08 ENCOUNTER — PREP FOR PROCEDURE (OUTPATIENT)
Dept: SURGERY | Age: 70
End: 2025-04-08

## 2025-04-08 VITALS
HEART RATE: 66 BPM | WEIGHT: 256 LBS | DIASTOLIC BLOOD PRESSURE: 70 MMHG | SYSTOLIC BLOOD PRESSURE: 130 MMHG | TEMPERATURE: 96.7 F | BODY MASS INDEX: 34.67 KG/M2 | RESPIRATION RATE: 18 BRPM | HEIGHT: 72 IN

## 2025-04-08 DIAGNOSIS — K22.70 BARRETT'S ESOPHAGUS WITHOUT DYSPLASIA: Primary | ICD-10-CM

## 2025-04-08 DIAGNOSIS — K22.10 ULCER OF ESOPHAGUS WITHOUT BLEEDING: ICD-10-CM

## 2025-04-08 PROCEDURE — 1159F MED LIST DOCD IN RCRD: CPT | Performed by: PHYSICIAN ASSISTANT

## 2025-04-08 PROCEDURE — G8417 CALC BMI ABV UP PARAM F/U: HCPCS | Performed by: PHYSICIAN ASSISTANT

## 2025-04-08 PROCEDURE — 1123F ACP DISCUSS/DSCN MKR DOCD: CPT | Performed by: PHYSICIAN ASSISTANT

## 2025-04-08 PROCEDURE — 3017F COLORECTAL CA SCREEN DOC REV: CPT | Performed by: PHYSICIAN ASSISTANT

## 2025-04-08 PROCEDURE — G8427 DOCREV CUR MEDS BY ELIG CLIN: HCPCS | Performed by: PHYSICIAN ASSISTANT

## 2025-04-08 PROCEDURE — 3075F SYST BP GE 130 - 139MM HG: CPT | Performed by: PHYSICIAN ASSISTANT

## 2025-04-08 PROCEDURE — 3078F DIAST BP <80 MM HG: CPT | Performed by: PHYSICIAN ASSISTANT

## 2025-04-08 PROCEDURE — 1126F AMNT PAIN NOTED NONE PRSNT: CPT | Performed by: PHYSICIAN ASSISTANT

## 2025-04-08 PROCEDURE — 99215 OFFICE O/P EST HI 40 MIN: CPT | Performed by: PHYSICIAN ASSISTANT

## 2025-04-08 PROCEDURE — 1036F TOBACCO NON-USER: CPT | Performed by: PHYSICIAN ASSISTANT

## 2025-04-08 PROCEDURE — 99213 OFFICE O/P EST LOW 20 MIN: CPT | Performed by: PHYSICIAN ASSISTANT

## 2025-04-08 NOTE — PROGRESS NOTES
continue the Prilosec 20 mg twice a day due to the Mccullough's esophagus, will need repeat EGD for monitoring in 3 to 5 years      (Please note that portions of this note were completed with a voice recognition program.  Efforts were made to edit the dictations but occasionally words are mis-transcribed.)

## 2025-04-11 SDOH — ECONOMIC STABILITY: FOOD INSECURITY: WITHIN THE PAST 12 MONTHS, THE FOOD YOU BOUGHT JUST DIDN'T LAST AND YOU DIDN'T HAVE MONEY TO GET MORE.: NEVER TRUE

## 2025-04-11 SDOH — ECONOMIC STABILITY: FOOD INSECURITY: WITHIN THE PAST 12 MONTHS, YOU WORRIED THAT YOUR FOOD WOULD RUN OUT BEFORE YOU GOT MONEY TO BUY MORE.: NEVER TRUE

## 2025-04-11 SDOH — ECONOMIC STABILITY: INCOME INSECURITY: IN THE LAST 12 MONTHS, WAS THERE A TIME WHEN YOU WERE NOT ABLE TO PAY THE MORTGAGE OR RENT ON TIME?: NO

## 2025-04-11 SDOH — ECONOMIC STABILITY: TRANSPORTATION INSECURITY
IN THE PAST 12 MONTHS, HAS THE LACK OF TRANSPORTATION KEPT YOU FROM MEDICAL APPOINTMENTS OR FROM GETTING MEDICATIONS?: NO

## 2025-04-11 ASSESSMENT — PATIENT HEALTH QUESTIONNAIRE - PHQ9
1. LITTLE INTEREST OR PLEASURE IN DOING THINGS: NOT AT ALL
2. FEELING DOWN, DEPRESSED OR HOPELESS: NOT AT ALL
SUM OF ALL RESPONSES TO PHQ QUESTIONS 1-9: 0
SUM OF ALL RESPONSES TO PHQ QUESTIONS 1-9: 0
SUM OF ALL RESPONSES TO PHQ9 QUESTIONS 1 & 2: 0
1. LITTLE INTEREST OR PLEASURE IN DOING THINGS: NOT AT ALL
2. FEELING DOWN, DEPRESSED OR HOPELESS: NOT AT ALL
SUM OF ALL RESPONSES TO PHQ QUESTIONS 1-9: 0
SUM OF ALL RESPONSES TO PHQ QUESTIONS 1-9: 0

## 2025-04-14 ENCOUNTER — OFFICE VISIT (OUTPATIENT)
Dept: FAMILY MEDICINE CLINIC | Age: 70
End: 2025-04-14
Payer: MEDICARE

## 2025-04-14 VITALS
SYSTOLIC BLOOD PRESSURE: 128 MMHG | WEIGHT: 249 LBS | HEART RATE: 64 BPM | BODY MASS INDEX: 33.72 KG/M2 | OXYGEN SATURATION: 95 % | DIASTOLIC BLOOD PRESSURE: 82 MMHG | HEIGHT: 72 IN

## 2025-04-14 DIAGNOSIS — E78.2 MIXED HYPERLIPIDEMIA: Primary | ICD-10-CM

## 2025-04-14 PROCEDURE — G8417 CALC BMI ABV UP PARAM F/U: HCPCS

## 2025-04-14 PROCEDURE — 1159F MED LIST DOCD IN RCRD: CPT

## 2025-04-14 PROCEDURE — 99213 OFFICE O/P EST LOW 20 MIN: CPT

## 2025-04-14 PROCEDURE — 1036F TOBACCO NON-USER: CPT

## 2025-04-14 PROCEDURE — 3074F SYST BP LT 130 MM HG: CPT

## 2025-04-14 PROCEDURE — 3079F DIAST BP 80-89 MM HG: CPT

## 2025-04-14 PROCEDURE — 99211 OFF/OP EST MAY X REQ PHY/QHP: CPT

## 2025-04-14 PROCEDURE — G8427 DOCREV CUR MEDS BY ELIG CLIN: HCPCS

## 2025-04-14 PROCEDURE — 1123F ACP DISCUSS/DSCN MKR DOCD: CPT

## 2025-04-14 PROCEDURE — 3017F COLORECTAL CA SCREEN DOC REV: CPT

## 2025-04-14 PROCEDURE — 1160F RVW MEDS BY RX/DR IN RCRD: CPT

## 2025-04-14 RX ORDER — FLUTICASONE PROPIONATE 50 MCG
1 SPRAY, SUSPENSION (ML) NASAL DAILY PRN
COMMUNITY
End: 2025-04-14 | Stop reason: SDUPTHER

## 2025-04-14 RX ORDER — FLUTICASONE PROPIONATE 50 MCG
1 SPRAY, SUSPENSION (ML) NASAL DAILY PRN
Qty: 16 G | Refills: 1 | Status: SHIPPED | OUTPATIENT
Start: 2025-04-14

## 2025-04-14 NOTE — PROGRESS NOTES
Marcus Mustafa (:  1955) is a 70 y.o. male,Established patient, here for evaluation of the following chief complaint(s):  Hyperlipidemia (Pt is here for a 6 month up. Pt has some scans done and would like to know the results of them. )         Assessment & Plan  Mixed hyperlipidemia   Chronic, at goal (stable), continue current treatment plan diet is again discussed, continue lower saturated fats, exercising 30 minutes a day with a goal 150 minutes a week.  Continue fish oil, continue monitoring your total cholesterol.  Will recheck lipid panel at next lab draw           Return in about 6 months (around 10/14/2025), or if symptoms worsen or fail to improve.       Subjective   Hyperlipidemia  This is a chronic problem. The current episode started more than 1 year ago. The problem is controlled. Recent lipid tests were reviewed and are high (Wants to work on diet has repeat upcoming and this year). Factors aggravating his hyperlipidemia include fatty foods. Pertinent negatives include no chest pain or shortness of breath. Current antihyperlipidemic treatment includes diet change and exercise. The current treatment provides mild improvement of lipids.       Review of Systems   Constitutional:  Negative for chills and fever.   HENT:  Negative for congestion.    Respiratory:  Negative for cough and shortness of breath.    Cardiovascular:  Negative for chest pain.   Gastrointestinal:  Negative for abdominal pain.   Skin:  Negative for color change.          Objective   Physical Exam  Vitals and nursing note reviewed.   Constitutional:       Appearance: Normal appearance.   HENT:      Head: Normocephalic.      Nose: Nose normal. No congestion.   Cardiovascular:      Rate and Rhythm: Normal rate and regular rhythm.      Pulses: Normal pulses.      Heart sounds: Normal heart sounds. No murmur heard.  Pulmonary:      Effort: Pulmonary effort is normal.      Breath sounds: Normal breath sounds.   Abdominal:

## 2025-04-17 NOTE — ASSESSMENT & PLAN NOTE
Chronic, at goal (stable), continue current treatment plan diet is again discussed, continue lower saturated fats, exercising 30 minutes a day with a goal 150 minutes a week.  Continue fish oil, continue monitoring your total cholesterol.  Will recheck lipid panel at next lab draw

## 2025-05-15 NOTE — H&P
Marcus Mustafa is a 70 y.o. male who presents today for  2 month f/u EGD with Ramon 1/28/25.  Patient states he prior had some epigastric discomfort heartburn symptoms mostly when laying down, did have some blood in his stools and that is what led to having EGD and colonoscopy, was found to have a gastric ulcer on the EGD and was increased on his acid reflux medications to Prilosec 20 mg twice a day and Pepcid 20 mg at night, since then has not had any type of reflux symptoms or epigastric pain and has been doing well, did have 1 isolated incident of small amount of blood in the stool about 3 weeks ago but none since then.  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  Also on the colonoscopy was found an abscess which spontaneously drained during the biopsy process, CT scan was done afterwards and did not demonstrate any findings of any further abscess or concerns.  He denies any nausea or vomiting, is eating and drinking well, once in a while will have some dysphagia symptoms with difficulty swallowing.     Findings:     GE junction at 38 cm  Distal esophagitis up about 2 cm  Mild distal stricture  Ulceration upper esophagus at 20 cm, bx taken     -- Diagnosis --   A. GASTRIC ANTRUM, BIOPSY:  Mild chronic inactive gastritis. No   Helicobacter organisms identified on H&E stain.      B. GASTRIC BODY POLYP, BIOPSY:  Fundic gland polyp.      C. DISTAL ESOPHAGUS, BIOPSY:  Focal intestinal metaplasia, consistent   with Mccullough's esophagus. Negative for dysplasia.      D. ULCER, UPPER ESOPHAGUS AT 20 CM, BIOPSY:  Focal intestinal   metaplasia, consistent with Mccullough's esophagus. Negative for   dysplasia.      E. COLON POLYP AT 25 CM, BIOPSY:  Unremarkable colonic mucosa with   benign lymphoid aggregate.      PLAN:  1.  Await bx  2.  May need EGD with dilation eventually.  3.  Will keep NPO and get CT scan stat to see

## 2025-05-19 RX ORDER — OMEPRAZOLE 20 MG/1
20 CAPSULE, DELAYED RELEASE ORAL 2 TIMES DAILY
Qty: 90 CAPSULE | Refills: 3 | Status: SHIPPED | OUTPATIENT
Start: 2025-05-19

## 2025-05-20 ENCOUNTER — HOSPITAL ENCOUNTER (OUTPATIENT)
Age: 70
Setting detail: OUTPATIENT SURGERY
Discharge: HOME | End: 2025-05-20
Attending: SURGERY | Admitting: SURGERY
Payer: MEDICARE

## 2025-05-20 ENCOUNTER — ANESTHESIA EVENT (OUTPATIENT)
Dept: OPERATING ROOM | Age: 70
End: 2025-05-20
Payer: MEDICARE

## 2025-05-20 ENCOUNTER — ANESTHESIA (OUTPATIENT)
Dept: OPERATING ROOM | Age: 70
End: 2025-05-20
Payer: MEDICARE

## 2025-05-20 VITALS
TEMPERATURE: 97.8 F | WEIGHT: 250.8 LBS | SYSTOLIC BLOOD PRESSURE: 103 MMHG | DIASTOLIC BLOOD PRESSURE: 58 MMHG | OXYGEN SATURATION: 93 % | BODY MASS INDEX: 33.97 KG/M2 | HEIGHT: 72 IN | RESPIRATION RATE: 16 BRPM | HEART RATE: 55 BPM

## 2025-05-20 PROCEDURE — 43249 ESOPH EGD DILATION <30 MM: CPT | Performed by: SURGERY

## 2025-05-20 PROCEDURE — 3700000001 HC ADD 15 MINUTES (ANESTHESIA): Performed by: SURGERY

## 2025-05-20 PROCEDURE — 7100000010 HC PHASE II RECOVERY - FIRST 15 MIN: Performed by: SURGERY

## 2025-05-20 PROCEDURE — 2580000003 HC RX 258: Performed by: SURGERY

## 2025-05-20 PROCEDURE — 2709999900 HC NON-CHARGEABLE SUPPLY: Performed by: SURGERY

## 2025-05-20 PROCEDURE — 3700000000 HC ANESTHESIA ATTENDED CARE: Performed by: SURGERY

## 2025-05-20 PROCEDURE — 3609017100 HC EGD: Performed by: SURGERY

## 2025-05-20 PROCEDURE — 7100000011 HC PHASE II RECOVERY - ADDTL 15 MIN: Performed by: SURGERY

## 2025-05-20 PROCEDURE — 6360000002 HC RX W HCPCS: Performed by: NURSE ANESTHETIST, CERTIFIED REGISTERED

## 2025-05-20 PROCEDURE — C1726 CATH, BAL DIL, NON-VASCULAR: HCPCS | Performed by: SURGERY

## 2025-05-20 RX ORDER — SODIUM CHLORIDE 0.9 % (FLUSH) 0.9 %
5-40 SYRINGE (ML) INJECTION PRN
Status: DISCONTINUED | OUTPATIENT
Start: 2025-05-20 | End: 2025-05-20 | Stop reason: HOSPADM

## 2025-05-20 RX ORDER — SODIUM CHLORIDE 0.9 % (FLUSH) 0.9 %
5-40 SYRINGE (ML) INJECTION EVERY 12 HOURS SCHEDULED
Status: DISCONTINUED | OUTPATIENT
Start: 2025-05-20 | End: 2025-05-20 | Stop reason: HOSPADM

## 2025-05-20 RX ORDER — SODIUM CHLORIDE, SODIUM LACTATE, POTASSIUM CHLORIDE, CALCIUM CHLORIDE 600; 310; 30; 20 MG/100ML; MG/100ML; MG/100ML; MG/100ML
INJECTION, SOLUTION INTRAVENOUS CONTINUOUS
Status: DISCONTINUED | OUTPATIENT
Start: 2025-05-20 | End: 2025-05-20 | Stop reason: HOSPADM

## 2025-05-20 RX ORDER — SODIUM CHLORIDE 9 MG/ML
25 INJECTION, SOLUTION INTRAVENOUS PRN
Status: DISCONTINUED | OUTPATIENT
Start: 2025-05-20 | End: 2025-05-20 | Stop reason: HOSPADM

## 2025-05-20 RX ORDER — LIDOCAINE HYDROCHLORIDE 40 MG/ML
INJECTION, SOLUTION RETROBULBAR
Status: DISCONTINUED | OUTPATIENT
Start: 2025-05-20 | End: 2025-05-20 | Stop reason: SDUPTHER

## 2025-05-20 RX ORDER — PROPOFOL 10 MG/ML
INJECTION, EMULSION INTRAVENOUS
Status: DISCONTINUED | OUTPATIENT
Start: 2025-05-20 | End: 2025-05-20 | Stop reason: SDUPTHER

## 2025-05-20 RX ADMIN — SODIUM CHLORIDE, POTASSIUM CHLORIDE, SODIUM LACTATE AND CALCIUM CHLORIDE: 600; 310; 30; 20 INJECTION, SOLUTION INTRAVENOUS at 08:31

## 2025-05-20 RX ADMIN — PROPOFOL 100 MG: 10 INJECTION, EMULSION INTRAVENOUS at 10:04

## 2025-05-20 RX ADMIN — PROPOFOL 180 MCG/KG/MIN: 10 INJECTION, EMULSION INTRAVENOUS at 10:05

## 2025-05-20 RX ADMIN — LIDOCAINE HYDROCHLORIDE 60 MG: 40 INJECTION, SOLUTION RETROBULBAR; TOPICAL at 10:04

## 2025-05-20 ASSESSMENT — PAIN - FUNCTIONAL ASSESSMENT
PAIN_FUNCTIONAL_ASSESSMENT: 0-10
PAIN_FUNCTIONAL_ASSESSMENT: 0-10

## 2025-05-20 ASSESSMENT — PAIN SCALES - GENERAL: PAINLEVEL_OUTOF10: 0

## 2025-05-20 NOTE — DISCHARGE INSTRUCTIONS
Continue current meds  Follow up with Jimbo Negron in 3 months, to see progress and check symptoms.  If asymptomatic would wean some of the PUD meds.    Then should have repeat EGD in 1 year for new diagnosis of Mccullough's esophagus.      DISCHARGE INSTRUCTIONS FOLLOWING EGD with BALLOON DILATION     Activity  You have received sedation.  Your judgement and coordination may be impaired.  Do not drive or operate any machinery today.  Do not make personal, medical, legal or business decisions today.  Do not consume alcohol, tranquilizers or sleeping medications today unless otherwise instructed by your physician.    Rest today.  You may resume normal activity tomorrow.  Because air is put into your stomach during this procedure, it is normal to pass large amounts or air/belch.  Feelings of  bloating, gas or cramping may persist for 24 hours.  You may have a sore throat and chest soreness from your dilation.  Please notify your surgeon if you develop acute pain or throw up blood.    Diet  Begin with sips of clear liquids and if no nausea, continue on liquids and soft foods only today (unless otherwise instructed by your physician).  Tomorrow, you may progress to your normal diet.    Medications  You may resume your usual medications, unless otherwise instructed.    Follow-Up  As instructed.    CALL YOUR PHYSICIAN if you experience any of the following:  Chest pain not relieved by belching.  Abdominal pain/cramping and/or distention that is not relieved by passing gas.  Persistent nausea & vomiting.  Throwing up blood.  Signs of infection including fever, chills, redness or swelling @ the IV site.    If you have questions/problems, call [phone] ([facility]) or after regular business hours call [phone] ([facility])DISCHARGE INSTRUCTIONS FOLLOWING EGD with BALLOON DILATION     Activity  You have received sedation.  Your judgement and coordination may be impaired.  Do not drive or operate

## 2025-05-20 NOTE — ANESTHESIA POSTPROCEDURE EVALUATION
Department of Anesthesiology  Postprocedure Note    Patient: Marcus Mustafa  MRN: [id number]  YOB: 1955  Date of evaluation: 5/20/2025    Procedure Summary       Date: 05/20/25 Room / Location: [facility] / [facility]    Anesthesia Start: 1003 Anesthesia Stop: 1025    Procedure: Esophagogastroduodenoscopy dilation Diagnosis:       Ulcer of esophagus without bleeding      Mccullough's esophagus without dysplasia      (Ulcer of esophagus without bleeding [K22.10])      (Mccullough's esophagus without dysplasia [K22.70])    Surgeons: Maurice Navarro MD Responsible Provider: Adonis Denton APRN - CRNA    Anesthesia Type: General, TIVA ASA Status: 3            Anesthesia Type: General, TIVA    Zaheer Phase I:      Zaheer Phase II: Zaheer Score: 4    Anesthesia Post Evaluation    Patient location during evaluation: bedside  Level of consciousness: sleepy but conscious  Airway patency: patent  Nausea & Vomiting: no nausea and no vomiting  Cardiovascular status: hemodynamically stable  Respiratory status: spontaneous ventilation  Hydration status: stable  Pain management: satisfactory to patient    No notable events documented.

## 2025-05-20 NOTE — ANESTHESIA PRE PROCEDURE
Department of Anesthesiology  Preprocedure Note       Name:  Marcus Mustafa   Age:  70 y.o.  :  1955                                          MRN:  [id number]         Date:  2025      Surgeon: Surgeon(s):  Maurice Navarro MD    Procedure: Procedure(s):  Esophagogastroduodenoscopy    Medications prior to admission:   Prior to Admission medications    Medication Sig Start Date End Date Taking? Authorizing Provider   omeprazole (PRILOSEC) 20 MG delayed release capsule Take 1 capsule by mouth in the morning and at bedtime 25  Yes Maurice Navarro MD   fluticasone (FLONASE) 50 MCG/ACT nasal spray 1 spray by Each Nostril route daily as needed for Rhinitis 25  Yes Ovi Lamb APRN - CNP   Cyanocobalamin (VITAMIN B 12 PO) Take 1,000 mg by mouth daily   Yes iGrish Chacon MD   famotidine (PEPCID) 20 MG tablet Take 1 tablet by mouth nightly 2/10/25  Yes Maurice Navarro MD   nebivolol (BYSTOLIC) 2.5 MG tablet TAKE 1 TABLET BY MOUTH EVERY DAY 25  Yes Ovi Lamb APRN - CNP   vitamin D 25 MCG (1000 UT) CAPS Take 1 capsule by mouth Daily   Yes Girish Chacon MD   Multiple Vitamin (MULTIVITAMIN ADULT PO) Take by mouth   Yes Girish Chacon MD   Coenzyme Q10 (CO Q-10) 100 MG CAPS Take by mouth   Yes Girish Chacon MD   Potassium 99 MG TABS Take by mouth   Yes Girish Chacon MD   Omega-3 Fatty Acids (FISH OIL) 1000 MG CAPS Take 3 capsules by mouth 3 times daily   Yes Girish Chacon MD   magnesium (MAGNESIUM-OXIDE) 250 MG TABS tablet Take 1 tablet by mouth daily   Yes Girish Chacon MD       Current medications:    Current Facility-Administered Medications   Medication Dose Route Frequency Provider Last Rate Last Admin   • lactated ringers infusion   IntraVENous Continuous Maurice Navarro MD 75 mL/hr at 25 0831 New Bag at 25 0831   • sodium chloride flush 0.9 % injection 5-40 mL  5-40 mL IntraVENous 2 times per day Maurice Navarro MD       • sodium

## 2025-05-20 NOTE — OP NOTE
EGD PROCEDURE NOTE:      Pre op diagnosis:     Last EGD 1/28/25: esophagitis, ulcer (jaime's tissue) esophagus at 20 cm, mild distal esophageal stricture  2.  Chronic GERD, currently on prilosec 20 mg bid and pepcid 20 mg q pm since last scope.    Operative Procedure:    1.  EGD with balloon dilation    Surgeon:    Dr. Maurice Navarro     Anesthesia:    IV sedation per CRNA    EBL:  minimal      Procedure:    Patient was taken to the endoscopy suite and placed in a left lateral decubitus position.  They were given IV sedation as mentioned above.  The gastric scope was passed through the mouth piece and down the esophagus, stomach and into the second portion of the duodenum.  It was withdrawn slowly .  Stomach appeared fairly normal.  The GE junction at 38 cm.  Mild esophagitis. Mild distal stricture.  We did a balloon dilation of the mild stricture from 45 fr to 54 fr.  There was a little bleeding so we stopped at 54 fr.  Pt tolerated well.  Scope removed.      Final Diagnosis:    GE junction at 38 cm  Jaime's tissue 1 cm by 1 cm at 20 cm in esophagus, already bx;ed in jan.  Mild stricture at 37 cm, dilated today with balloon at 45 , 50 and 54 fr.      Plan:    Continue current meds  Follow up with Jimbo Negron in 3 months, to see progress and check symptoms.  If asymptomatic would wean some of the PUD meds.    Then should have repeat EGD in 1 year for new diagnosis of Jaime's esophagus.  
POLYP AT 25 CM, BIOPSY:  Unremarkable colonic mucosa with   benign lymphoid aggregate.     Plan:    1.  Patient has had 3 months of treatment and sx are resolved and ulcer is healed so I would advise patient to stop pepcid and continue prilosec 20mg BID, repeat EGD in 1 year for monitoring of barretts.

## 2025-06-06 ENCOUNTER — TELEPHONE (OUTPATIENT)
Dept: SURGERY | Age: 70
End: 2025-06-06

## 2025-06-06 NOTE — TELEPHONE ENCOUNTER
Called patient with EGD with dilatation results from 5-20-25 by Dr Navarro @ [facility].  Also with his recommendations.  Patient verbalized understanding.  Updated history and recall. Forwarded results to PCP.

## 2025-08-05 ENCOUNTER — OFFICE VISIT (OUTPATIENT)
Dept: SURGERY | Age: 70
End: 2025-08-05
Payer: MEDICARE

## 2025-08-05 VITALS
DIASTOLIC BLOOD PRESSURE: 90 MMHG | BODY MASS INDEX: 33.46 KG/M2 | HEART RATE: 62 BPM | OXYGEN SATURATION: 96 % | WEIGHT: 247 LBS | HEIGHT: 72 IN | SYSTOLIC BLOOD PRESSURE: 142 MMHG

## 2025-08-05 DIAGNOSIS — K22.10 ULCER OF ESOPHAGUS WITHOUT BLEEDING: ICD-10-CM

## 2025-08-05 DIAGNOSIS — K22.70 BARRETT'S ESOPHAGUS WITHOUT DYSPLASIA: Primary | ICD-10-CM

## 2025-08-05 PROCEDURE — 3080F DIAST BP >= 90 MM HG: CPT | Performed by: PHYSICIAN ASSISTANT

## 2025-08-05 PROCEDURE — 99212 OFFICE O/P EST SF 10 MIN: CPT | Performed by: PHYSICIAN ASSISTANT

## 2025-08-05 PROCEDURE — 3017F COLORECTAL CA SCREEN DOC REV: CPT | Performed by: PHYSICIAN ASSISTANT

## 2025-08-05 PROCEDURE — 1159F MED LIST DOCD IN RCRD: CPT | Performed by: PHYSICIAN ASSISTANT

## 2025-08-05 PROCEDURE — G8427 DOCREV CUR MEDS BY ELIG CLIN: HCPCS | Performed by: PHYSICIAN ASSISTANT

## 2025-08-05 PROCEDURE — 3077F SYST BP >= 140 MM HG: CPT | Performed by: PHYSICIAN ASSISTANT

## 2025-08-05 PROCEDURE — 1123F ACP DISCUSS/DSCN MKR DOCD: CPT | Performed by: PHYSICIAN ASSISTANT

## 2025-08-05 PROCEDURE — G8417 CALC BMI ABV UP PARAM F/U: HCPCS | Performed by: PHYSICIAN ASSISTANT

## 2025-08-05 PROCEDURE — 1036F TOBACCO NON-USER: CPT | Performed by: PHYSICIAN ASSISTANT

## 2025-08-05 PROCEDURE — 99213 OFFICE O/P EST LOW 20 MIN: CPT | Performed by: PHYSICIAN ASSISTANT

## 2025-08-05 RX ORDER — OMEPRAZOLE 20 MG/1
20 CAPSULE, DELAYED RELEASE ORAL DAILY
Qty: 90 CAPSULE | Refills: 3 | Status: SHIPPED | OUTPATIENT
Start: 2025-08-05

## 2025-08-05 RX ORDER — FAMOTIDINE 20 MG/1
20 TABLET, FILM COATED ORAL NIGHTLY
Qty: 90 TABLET | Refills: 3 | Status: SHIPPED | OUTPATIENT
Start: 2025-08-05

## 2025-08-15 ENCOUNTER — OFFICE VISIT (OUTPATIENT)
Dept: FAMILY MEDICINE CLINIC | Age: 70
End: 2025-08-15
Payer: MEDICARE

## 2025-08-15 VITALS
HEART RATE: 78 BPM | WEIGHT: 248 LBS | DIASTOLIC BLOOD PRESSURE: 88 MMHG | SYSTOLIC BLOOD PRESSURE: 130 MMHG | RESPIRATION RATE: 16 BRPM | OXYGEN SATURATION: 99 % | BODY MASS INDEX: 33.63 KG/M2

## 2025-08-15 DIAGNOSIS — D22.9 ATYPICAL NEVI: Primary | ICD-10-CM

## 2025-08-15 PROCEDURE — 3017F COLORECTAL CA SCREEN DOC REV: CPT

## 2025-08-15 PROCEDURE — 3075F SYST BP GE 130 - 139MM HG: CPT

## 2025-08-15 PROCEDURE — 3079F DIAST BP 80-89 MM HG: CPT

## 2025-08-15 PROCEDURE — G8417 CALC BMI ABV UP PARAM F/U: HCPCS

## 2025-08-15 PROCEDURE — 99212 OFFICE O/P EST SF 10 MIN: CPT

## 2025-08-15 PROCEDURE — 1123F ACP DISCUSS/DSCN MKR DOCD: CPT

## 2025-08-15 PROCEDURE — 1036F TOBACCO NON-USER: CPT

## 2025-08-15 PROCEDURE — 99213 OFFICE O/P EST LOW 20 MIN: CPT

## 2025-08-15 PROCEDURE — G8427 DOCREV CUR MEDS BY ELIG CLIN: HCPCS

## 2025-08-22 ASSESSMENT — ENCOUNTER SYMPTOMS: SHORTNESS OF BREATH: 0

## (undated) DEVICE — BITE BLOCK W/VELCRO STRAP

## (undated) DEVICE — TOWEL,OR,DSP,ST,BLUE,STD,4/PK,20PK/CS: Brand: MEDLINE

## (undated) DEVICE — SUTURE NRLN SZ 0 L18IN NONABSORBABLE BLK L26MM CT-2 1/2 CIR C527D

## (undated) DEVICE — Z DISCONTINUED USE 2624853 GLOVE SURG SZ 75 L12IN THK91MIL BRN LTX FREE

## (undated) DEVICE — 3M™ TEGADERM™ TRANSPARENT FILM DRESSING FRAME STYLE, 1627, 4 IN X 10 IN (10 CM X 25 CM), 20/CT 4CT/CASE: Brand: 3M™ TEGADERM™

## (undated) DEVICE — SUTURE VICRYL SZ 3-0 L27IN ABSRB UD L26MM CT-2 1/2 CIR J232H

## (undated) DEVICE — Z INACTIVE NO ACTIVE SUPPLIER APPLICATOR MEDICATED 26 CC TINT HI-LITE ORNG STRL CHLORAPREP

## (undated) DEVICE — CO2 CANNULA,SSOFT,ADLT,7O2,4CO2,FEMALE: Brand: MEDLINE

## (undated) DEVICE — MASTISOL ADHESIVE LIQ 2/3ML

## (undated) DEVICE — GLOVE ORANGE PI 7   MSG9070

## (undated) DEVICE — SYRINGE INFL 60ML DISP ALLIANCE II

## (undated) DEVICE — PACK SURG PROC REMINDER N WVN DISPOSABLE BEAC TIME OUT

## (undated) DEVICE — FORCEPS BX L240CM JAW DIA2.4MM ORNG L CAP W/ NDL DISP RAD

## (undated) DEVICE — GARMENT,MEDLINE,DVT,INT,CALF,MED, GEN2: Brand: MEDLINE

## (undated) DEVICE — LABEL PRNT MARKER W/ PRE PRNT CUST

## (undated) DEVICE — 4-PORT MANIFOLD: Brand: NEPTUNE 2

## (undated) DEVICE — GOWN,AURORA,NONRNF,XL,30/CS: Brand: MEDLINE

## (undated) DEVICE — SOLUTION IV IRRIG POUR BRL 0.9% SODIUM CHL 2F7124

## (undated) DEVICE — SUTURE VICRYL SZ 0 L27IN ABSRB VLT L36MM CT-1 1/2 CIR J340H

## (undated) DEVICE — MERCY DEFIANCE ENDO KIT: Brand: MEDLINE INDUSTRIES, INC.

## (undated) DEVICE — GLOVE ORANGE PI 8   MSG9080

## (undated) DEVICE — COVER LT HNDL BLU PLAS

## (undated) DEVICE — 1200CC GUARDIAN II: Brand: GUARDIAN

## (undated) DEVICE — PAD, GROUNDING, UNIVERSAL, SPLIT, 9': Brand: MEDLINE

## (undated) DEVICE — PACK,LAPAROTOMY,PK IV,AURORA: Brand: MEDLINE

## (undated) DEVICE — GAUZE,SPONGE,4"X4",12PLY,STERILE,LF,2'S: Brand: MEDLINE

## (undated) DEVICE — STRIP,CLOSURE,WOUND,MEDI-STRIP,1/2X4: Brand: MEDLINE

## (undated) DEVICE — TUBING, SUCTION, 3/16" X 20', STRAIGHT: Brand: MEDLINE

## (undated) DEVICE — ESOPHAGEAL BALLOON DILATATION CATHETER: Brand: CRE FIXED WIRE